# Patient Record
Sex: FEMALE | Race: BLACK OR AFRICAN AMERICAN | NOT HISPANIC OR LATINO | URBAN - METROPOLITAN AREA
[De-identification: names, ages, dates, MRNs, and addresses within clinical notes are randomized per-mention and may not be internally consistent; named-entity substitution may affect disease eponyms.]

---

## 2023-08-01 ENCOUNTER — APPOINTMENT (OUTPATIENT)
Dept: LAB | Facility: LAB | Age: 73
End: 2023-08-01
Payer: MEDICARE

## 2023-08-01 LAB
ACTIVATED PARTIAL THROMBOPLASTIN TIME IN PPP BY COAGULATION ASSAY: 33 SEC (ref 27–38)
ALBUMIN (G/DL) IN SER/PLAS: 3.8 G/DL (ref 3.4–5)
ANION GAP IN SER/PLAS: 11 MMOL/L (ref 10–20)
BASOPHILS (10*3/UL) IN BLOOD BY AUTOMATED COUNT: 0.03 X10E9/L (ref 0–0.1)
BASOPHILS/100 LEUKOCYTES IN BLOOD BY AUTOMATED COUNT: 0.7 % (ref 0–2)
CALCIUM (MG/DL) IN SER/PLAS: 9.6 MG/DL (ref 8.6–10.6)
CARBON DIOXIDE, TOTAL (MMOL/L) IN SER/PLAS: 29 MMOL/L (ref 21–32)
CHLORIDE (MMOL/L) IN SER/PLAS: 105 MMOL/L (ref 98–107)
CREATININE (MG/DL) IN SER/PLAS: 0.58 MG/DL (ref 0.5–1.05)
EOSINOPHILS (10*3/UL) IN BLOOD BY AUTOMATED COUNT: 0.27 X10E9/L (ref 0–0.4)
EOSINOPHILS/100 LEUKOCYTES IN BLOOD BY AUTOMATED COUNT: 6.1 % (ref 0–6)
ERYTHROCYTE DISTRIBUTION WIDTH (RATIO) BY AUTOMATED COUNT: 15.9 % (ref 11.5–14.5)
ERYTHROCYTE MEAN CORPUSCULAR HEMOGLOBIN CONCENTRATION (G/DL) BY AUTOMATED: 30.9 G/DL (ref 32–36)
ERYTHROCYTE MEAN CORPUSCULAR VOLUME (FL) BY AUTOMATED COUNT: 80 FL (ref 80–100)
ERYTHROCYTES (10*6/UL) IN BLOOD BY AUTOMATED COUNT: 4.52 X10E12/L (ref 4–5.2)
GFR FEMALE: >90 ML/MIN/1.73M2
GLUCOSE (MG/DL) IN SER/PLAS: 93 MG/DL (ref 74–99)
HEMATOCRIT (%) IN BLOOD BY AUTOMATED COUNT: 36.3 % (ref 36–46)
HEMOGLOBIN (G/DL) IN BLOOD: 11.2 G/DL (ref 12–16)
IMMATURE GRANULOCYTES/100 LEUKOCYTES IN BLOOD BY AUTOMATED COUNT: 0.2 % (ref 0–0.9)
INR IN PPP BY COAGULATION ASSAY: 1.1 (ref 0.9–1.1)
LEUKOCYTES (10*3/UL) IN BLOOD BY AUTOMATED COUNT: 4.4 X10E9/L (ref 4.4–11.3)
LYMPHOCYTES (10*3/UL) IN BLOOD BY AUTOMATED COUNT: 0.9 X10E9/L (ref 0.8–3)
LYMPHOCYTES/100 LEUKOCYTES IN BLOOD BY AUTOMATED COUNT: 20.5 % (ref 13–44)
MONOCYTES (10*3/UL) IN BLOOD BY AUTOMATED COUNT: 0.25 X10E9/L (ref 0.05–0.8)
MONOCYTES/100 LEUKOCYTES IN BLOOD BY AUTOMATED COUNT: 5.7 % (ref 2–10)
NEUTROPHILS (10*3/UL) IN BLOOD BY AUTOMATED COUNT: 2.94 X10E9/L (ref 1.6–5.5)
NEUTROPHILS/100 LEUKOCYTES IN BLOOD BY AUTOMATED COUNT: 66.8 % (ref 40–80)
NRBC (PER 100 WBCS) BY AUTOMATED COUNT: 0 /100 WBC (ref 0–0)
PARATHYRIN INTACT (PG/ML) IN SER/PLAS: 215.7 PG/ML (ref 18.5–88)
PHOSPHATE (MG/DL) IN SER/PLAS: 3.5 MG/DL (ref 2.5–4.9)
PLATELETS (10*3/UL) IN BLOOD AUTOMATED COUNT: 179 X10E9/L (ref 150–450)
POTASSIUM (MMOL/L) IN SER/PLAS: 3.9 MMOL/L (ref 3.5–5.3)
PROTHROMBIN TIME (PT) IN PPP BY COAGULATION ASSAY: 12.4 SEC (ref 9.8–12.8)
SODIUM (MMOL/L) IN SER/PLAS: 141 MMOL/L (ref 136–145)
THYROPEROXIDASE AB (IU/ML) IN SER/PLAS: 28 IU/ML
THYROTROPIN (MIU/L) IN SER/PLAS BY DETECTION LIMIT <= 0.05 MIU/L: 1.87 MIU/L (ref 0.44–3.98)
THYROXINE (T4) FREE (NG/DL) IN SER/PLAS: 1.13 NG/DL (ref 0.78–1.48)
TRIIODOTHYRONINE (T3) (NG/DL) IN SER/PLAS: 110 NG/DL (ref 60–200)
UREA NITROGEN (MG/DL) IN SER/PLAS: 8 MG/DL (ref 6–23)

## 2023-08-03 LAB — VITAMIN D 1,25-DIHYDROXY: 69.4 PG/ML (ref 19.9–79.3)

## 2023-08-04 LAB
THYROGLOBULIN AB (IU/ML) IN SER/PLAS: <0.9 IU/ML (ref 0–4)
THYROGLOBULIN LC-MS/MS: ABNORMAL NG/ML (ref 1.3–31.8)
THYROGLOBULIN: 36.4 NG/ML (ref 1.3–31.8)

## 2023-08-05 LAB — THYROID STIMULATING IMMUNOGLOBULIN: <1 TSI INDEX

## 2023-08-11 LAB
ALBUMIN (G/DL) IN SER/PLAS: 4 G/DL (ref 3.4–5)
ANION GAP IN SER/PLAS: 11 MMOL/L (ref 10–20)
CALCIUM (MG/DL) IN SER/PLAS: 10 MG/DL (ref 8.6–10.6)
CARBON DIOXIDE, TOTAL (MMOL/L) IN SER/PLAS: 30 MMOL/L (ref 21–32)
CHLORIDE (MMOL/L) IN SER/PLAS: 105 MMOL/L (ref 98–107)
CREATININE (MG/DL) IN SER/PLAS: 0.64 MG/DL (ref 0.5–1.05)
GFR FEMALE: >90 ML/MIN/1.73M2
GLUCOSE (MG/DL) IN SER/PLAS: 95 MG/DL (ref 74–99)
PARATHYRIN INTACT (PG/ML) IN SER/PLAS: 317.4 PG/ML (ref 18.5–88)
PHOSPHATE (MG/DL) IN SER/PLAS: 3.9 MG/DL (ref 2.5–4.9)
POTASSIUM (MMOL/L) IN SER/PLAS: 4 MMOL/L (ref 3.5–5.3)
SODIUM (MMOL/L) IN SER/PLAS: 142 MMOL/L (ref 136–145)
UREA NITROGEN (MG/DL) IN SER/PLAS: 13 MG/DL (ref 6–23)

## 2023-08-17 LAB
CALCIUM (MG/DL) IN URINE: 9.2 MG/DL
CALCIUM (MG/L) IN 24 HOUR URINE: 92 MG/24H (ref 100–300)
COLLECTION DURATION OF URINE: 24 HR
CREATININE (MG/24HR) IN 24 HOUR URINE: 1.12 G/24H (ref 0.67–1.59)
CREATININE (MG/DL) IN URINE: 111.8 MG/DL (ref 20–320)
PHOSPHATE (MG/DL) IN URINE: 75 MG/DL
PHOSPHORUS 24 HOUR URINE: 750 MG/24H (ref 400–1300)
SODIUM (MMOL/24 HR) IN 24 HOUR URINE: 81 MMOL/24H (ref 80–220)
SODIUM (MMOL/L ) IN URINE: 81 MMOL/L
VOLUME OF URINE: 1000 ML

## 2023-08-22 ENCOUNTER — HOSPITAL ENCOUNTER (OUTPATIENT)
Dept: DATA CONVERSION | Facility: HOSPITAL | Age: 73
End: 2023-08-22
Attending: INTERNAL MEDICINE | Admitting: INTERNAL MEDICINE
Payer: COMMERCIAL

## 2023-08-22 DIAGNOSIS — E04.1 NONTOXIC SINGLE THYROID NODULE: ICD-10-CM

## 2023-08-22 DIAGNOSIS — E04.2 NONTOXIC MULTINODULAR GOITER: ICD-10-CM

## 2023-08-23 LAB
COMPLETE PATHOLOGY REPORT: NORMAL
CONVERTED CLINICAL DIAGNOSIS-HISTORY: NORMAL
CONVERTED DIAGNOSIS COMMENT: NORMAL
CONVERTED FINAL DIAGNOSIS: NORMAL
CONVERTED FINAL REPORT PDF LINK TO COPY AND PASTE: NORMAL
CONVERTED SPECIMEN DESCRIPTION: NORMAL

## 2023-08-24 LAB
COMPLETE PATHOLOGY REPORT: NORMAL
CONVERTED CLINICAL DIAGNOSIS-HISTORY: NORMAL
CONVERTED FINAL DIAGNOSIS: NORMAL
CONVERTED FINAL REPORT PDF LINK TO COPY AND PASTE: NORMAL
CONVERTED GROSS DESCRIPTION: NORMAL

## 2023-09-03 PROBLEM — I89.0 LYMPHEDEMA: Status: ACTIVE | Noted: 2023-09-03

## 2023-09-03 PROBLEM — M17.11 OSTEOARTHRITIS OF RIGHT KNEE: Status: ACTIVE | Noted: 2023-09-03

## 2023-09-03 PROBLEM — R92.8 ABNORMAL MAMMOGRAM: Status: ACTIVE | Noted: 2023-09-03

## 2023-09-03 PROBLEM — E83.52 HYPERCALCEMIA: Status: ACTIVE | Noted: 2023-09-03

## 2023-09-03 PROBLEM — E88.09 HYPERPROTEINEMIA: Status: ACTIVE | Noted: 2023-09-03

## 2023-09-03 PROBLEM — E55.9 VITAMIN D DEFICIENCY, UNSPECIFIED: Status: ACTIVE | Noted: 2023-09-03

## 2023-09-03 PROBLEM — R73.03 PREDIABETES: Status: ACTIVE | Noted: 2023-09-03

## 2023-09-03 RX ORDER — SULFAMETHOXAZOLE AND TRIMETHOPRIM 800; 160 MG/1; MG/1
TABLET ORAL
COMMUNITY
Start: 2023-03-16 | End: 2023-11-06 | Stop reason: SDUPTHER

## 2023-09-03 RX ORDER — NADOLOL 80 MG/1
1 TABLET ORAL DAILY
COMMUNITY

## 2023-09-03 RX ORDER — GENTAMICIN SULFATE 1 MG/G
OINTMENT TOPICAL
COMMUNITY
End: 2024-02-27 | Stop reason: SDUPTHER

## 2023-09-03 RX ORDER — LISINOPRIL 40 MG/1
1 TABLET ORAL DAILY
COMMUNITY
End: 2024-02-14

## 2023-09-03 RX ORDER — AMLODIPINE BESYLATE 10 MG/1
1 TABLET ORAL DAILY
COMMUNITY
End: 2024-03-04

## 2023-09-03 RX ORDER — PRAZOSIN HYDROCHLORIDE 2 MG/1
1 CAPSULE ORAL 2 TIMES DAILY
COMMUNITY

## 2023-09-03 RX ORDER — ALBUTEROL SULFATE 90 UG/1
1 AEROSOL, METERED RESPIRATORY (INHALATION) EVERY 6 HOURS PRN
COMMUNITY
Start: 2013-01-05 | End: 2023-12-13 | Stop reason: SDUPTHER

## 2023-09-26 ENCOUNTER — HOSPITAL ENCOUNTER (OUTPATIENT)
Dept: DATA CONVERSION | Facility: HOSPITAL | Age: 73
Discharge: HOME | End: 2023-09-26
Payer: COMMERCIAL

## 2023-09-26 DIAGNOSIS — Z12.31 ENCOUNTER FOR SCREENING MAMMOGRAM FOR MALIGNANT NEOPLASM OF BREAST: ICD-10-CM

## 2023-10-10 ENCOUNTER — CLINICAL SUPPORT (OUTPATIENT)
Dept: PRIMARY CARE | Facility: CLINIC | Age: 73
End: 2023-10-10
Payer: MEDICARE

## 2023-10-10 ENCOUNTER — TELEPHONE (OUTPATIENT)
Dept: PRIMARY CARE | Facility: CLINIC | Age: 73
End: 2023-10-10

## 2023-10-10 DIAGNOSIS — Z23 NEED FOR VACCINATION: ICD-10-CM

## 2023-10-10 PROCEDURE — 90662 IIV NO PRSV INCREASED AG IM: CPT | Performed by: INTERNAL MEDICINE

## 2023-10-10 PROCEDURE — G0008 ADMIN INFLUENZA VIRUS VAC: HCPCS | Performed by: INTERNAL MEDICINE

## 2023-10-20 DIAGNOSIS — E83.52 HYPERCALCEMIA: Primary | ICD-10-CM

## 2023-10-20 NOTE — PROGRESS NOTES
Placing lab orders --Will repeat CMP, phos, and PTH after initiation of vitamin D 4000IU daily 2 mo ago (pt has MGUS therefore would like to check a total protein with the CMP))

## 2023-11-06 ENCOUNTER — TELEPHONE (OUTPATIENT)
Dept: PRIMARY CARE | Facility: CLINIC | Age: 73
End: 2023-11-06
Payer: COMMERCIAL

## 2023-11-06 DIAGNOSIS — L03.115 BILATERAL LOWER LEG CELLULITIS: Primary | ICD-10-CM

## 2023-11-06 DIAGNOSIS — L03.116 BILATERAL LOWER LEG CELLULITIS: Primary | ICD-10-CM

## 2023-11-06 RX ORDER — SULFAMETHOXAZOLE AND TRIMETHOPRIM 800; 160 MG/1; MG/1
1 TABLET ORAL 2 TIMES DAILY
Qty: 20 TABLET | Refills: 0 | Status: SHIPPED | OUTPATIENT
Start: 2023-11-06 | End: 2023-11-16

## 2023-11-06 NOTE — TELEPHONE ENCOUNTER
Pt left VM requesting a refill of the antibiotics she takes when her legs start to weep. Pt stated the antibiotic is sulfamethazole-trimethoprim

## 2023-12-13 ENCOUNTER — OFFICE VISIT (OUTPATIENT)
Dept: PRIMARY CARE | Facility: CLINIC | Age: 73
End: 2023-12-13
Payer: MEDICARE

## 2023-12-13 ENCOUNTER — TELEPHONE (OUTPATIENT)
Dept: PRIMARY CARE | Facility: CLINIC | Age: 73
End: 2023-12-13

## 2023-12-13 VITALS
HEIGHT: 66 IN | SYSTOLIC BLOOD PRESSURE: 120 MMHG | TEMPERATURE: 98.7 F | OXYGEN SATURATION: 99 % | HEART RATE: 64 BPM | WEIGHT: 293 LBS | BODY MASS INDEX: 47.09 KG/M2 | DIASTOLIC BLOOD PRESSURE: 70 MMHG

## 2023-12-13 DIAGNOSIS — I10 ESSENTIAL (PRIMARY) HYPERTENSION: ICD-10-CM

## 2023-12-13 DIAGNOSIS — Z00.00 ADULT GENERAL MEDICAL EXAM: Primary | ICD-10-CM

## 2023-12-13 DIAGNOSIS — J40 BRONCHITIS: ICD-10-CM

## 2023-12-13 DIAGNOSIS — I89.0 LYMPHEDEMA: ICD-10-CM

## 2023-12-13 PROCEDURE — 1126F AMNT PAIN NOTED NONE PRSNT: CPT | Performed by: INTERNAL MEDICINE

## 2023-12-13 PROCEDURE — 1159F MED LIST DOCD IN RCRD: CPT | Performed by: INTERNAL MEDICINE

## 2023-12-13 PROCEDURE — 3078F DIAST BP <80 MM HG: CPT | Performed by: INTERNAL MEDICINE

## 2023-12-13 PROCEDURE — 99215 OFFICE O/P EST HI 40 MIN: CPT | Performed by: INTERNAL MEDICINE

## 2023-12-13 PROCEDURE — 1036F TOBACCO NON-USER: CPT | Performed by: INTERNAL MEDICINE

## 2023-12-13 PROCEDURE — 3074F SYST BP LT 130 MM HG: CPT | Performed by: INTERNAL MEDICINE

## 2023-12-13 PROCEDURE — G0439 PPPS, SUBSEQ VISIT: HCPCS | Performed by: INTERNAL MEDICINE

## 2023-12-13 RX ORDER — SULFAMETHOXAZOLE AND TRIMETHOPRIM 800; 160 MG/1; MG/1
1 TABLET ORAL 2 TIMES DAILY
Qty: 20 TABLET | Refills: 1 | Status: SHIPPED | OUTPATIENT
Start: 2023-12-13 | End: 2023-12-23

## 2023-12-13 RX ORDER — ALBUTEROL SULFATE 90 UG/1
1 AEROSOL, METERED RESPIRATORY (INHALATION) EVERY 6 HOURS PRN
Qty: 8 G | Refills: 1 | Status: SHIPPED | OUTPATIENT
Start: 2023-12-13

## 2023-12-13 ASSESSMENT — PATIENT HEALTH QUESTIONNAIRE - PHQ9
SUM OF ALL RESPONSES TO PHQ9 QUESTIONS 1 AND 2: 0
2. FEELING DOWN, DEPRESSED OR HOPELESS: NOT AT ALL
1. LITTLE INTEREST OR PLEASURE IN DOING THINGS: NOT AT ALL

## 2023-12-13 ASSESSMENT — PAIN SCALES - GENERAL: PAINLEVEL: 0-NO PAIN

## 2023-12-13 NOTE — PROGRESS NOTES
Subjective   Reason for Visit: May Pratt is an 73 y.o. female here for a Annual Exam.     Past Medical, Surgical, and Family History reviewed and updated in chart.    Reviewed all medications by prescribing practitioner or clinical pharmacist (such as prescriptions, OTCs, herbal therapies and supplements) and documented in the medical record.    HPI  1.  Medicare wellness/physical exam. Pt feels well. She is requesting med RF.  Pap: last done, N/A  Mammogram: last done 9/2023  Colonoscopy: last done 6/2022 (polyp)  Immunizations:     Review of Systems  All pertinent positive symptoms are included in the history of present illness.    All other systems have been reviewed and are negative and noncontributory to this patient's current ailments.    Current Outpatient Medications   Medication Instructions    albuterol 90 mcg/actuation inhaler 1 puff, inhalation, Every 6 hours PRN    amLODIPine (Norvasc) 10 mg tablet 1 tablet, oral, Daily    gentamicin (Garamycin) 0.1 % ointment apply to affected area Externally Twice a day<BR>    lisinopril 40 mg tablet 1 tablet, oral, Daily    nadolol (Corgard) 80 mg tablet 1 tablet, oral, Daily    prazosin (Minipress) 2 mg capsule 1 capsule, oral, 2 times daily    sulfamethoxazole-trimethoprim (Bactrim DS) 800-160 mg tablet 1 tablet, oral, 2 times daily     No Known Allergies  Immunization History   Administered Date(s) Administered    Flu vaccine, quadrivalent, high-dose, preservative free, age 65y+ (FLUZONE) 11/16/2020, 11/17/2021, 12/01/2022, 10/10/2023    Influenza, High Dose Seasonal, Preservative Free 10/05/2018, 11/14/2019    Influenza, Unspecified 10/22/2008, 10/24/2009, 10/08/2011, 10/06/2012, 11/02/2013    Influenza, injectable, quadrivalent 10/07/2016, 10/09/2017    Influenza, seasonal, injectable 09/30/1997, 10/06/2012, 11/02/2013, 09/08/2014, 11/11/2015    Moderna COVID-19 vaccine, bivalent, blue cap/gray label *Check age/dose* 11/03/2022    Moderna SARS-CoV-2  "Vaccination 03/11/2021, 04/08/2021, 11/04/2021, 04/21/2022    Pneumococcal conjugate vaccine, 13-valent (PREVNAR 13) 04/22/2015    Pneumococcal polysaccharide vaccine, 23-valent, age 2 years and older (PNEUMOVAX 23) 09/02/2016    Tdap vaccine, age 7 year and older (BOOSTRIX) 09/08/2014    Zoster, live 12/08/2014     Past Surgical History:   Procedure Laterality Date    US GUIDED THYROID BIOPSY  8/22/2023    US GUIDED THYROID BIOPSY 8/22/2023 [facility]    US GUIDED THYROID BIOPSY  8/22/2023    US GUIDED THYROID BIOPSY 8/22/2023 [facility]     No family history on file.    Social History     Tobacco Use    Smoking status: Never    Smokeless tobacco: Never   Substance Use Topics    Alcohol use: Never    Drug use: Never                         Objective   Visit Vitals  /70   Pulse 64   Temp 37.1 °C (98.7 °F)   Ht 1.676 m (5' 6\")   Wt 145 kg (319 lb)   SpO2 99%   BMI 51.49 kg/m²   Smoking Status Never   BSA 2.6 m²      Physical Exam  CONSTITUTIONAL - well nourished, well developed, looks like stated age, in no acute distress, not ill-appearing, and not tired appearing  SKIN - normal skin color and pigmentation, normal skin turgor without rash, lesions, or nodules visualized  HEAD - no trauma, normocephalic  EYES - pupils are equal and reactive to light, extraocular muscles are intact, and normal external exam  ENT - TM's intact, no injection, no signs of infection, uvula midline, normal tongue movement and throat normal, no exudate, nasal passage without discharge and patent  NECK - supple without rigidity, no neck mass was observed, no thyromegaly or thyroid nodules  CHEST - clear to auscultation, no wheezing, no crackles and no rales, good effort  CARDIAC - regular rate and regular rhythm, no skipped beats, no murmur  ABDOMEN - no organomegaly, soft, nontender, nondistended, normal bowel sounds, no guarding/rebound/rigidity, negative McBurney sign and negative Wallace sign  EXTREMITIES - + lymphedema BLE  NEUROLOGICAL - " normal gait, normal balance, normal motor, no ataxia; alert, oriented and no focal signs  PSYCHIATRIC - alert, pleasant and cordial, age-appropriate  IMMUNOLOGIC - no cervical lymphadenopathy    Assessment/Plan   Problem List Items Addressed This Visit       Essential (primary) hypertension    Relevant Orders    Lipid panel    Lymphedema    Relevant Medications    sulfamethoxazole-trimethoprim (Bactrim DS) 800-160 mg tablet     Other Visit Diagnoses       Adult general medical exam    -  Primary    Healthy diet and physical activity as tolerated    Bronchitis        Relevant Medications    albuterol 90 mcg/actuation inhaler          Patient Care Team:  Katie Melissa MD as PCP - General (Internal Medicine)  Katie Melissa MD as Primary Care Provider  Katie Melissa MD (Internal Medicine)

## 2023-12-14 DIAGNOSIS — E83.52 HYPERCALCEMIA: Primary | ICD-10-CM

## 2023-12-15 ENCOUNTER — LAB (OUTPATIENT)
Dept: LAB | Facility: LAB | Age: 73
End: 2023-12-15
Payer: MEDICARE

## 2023-12-15 DIAGNOSIS — I10 ESSENTIAL (PRIMARY) HYPERTENSION: ICD-10-CM

## 2023-12-15 DIAGNOSIS — E83.52 HYPERCALCEMIA: ICD-10-CM

## 2023-12-15 LAB
ALBUMIN SERPL BCP-MCNC: 4 G/DL (ref 3.4–5)
ALP SERPL-CCNC: 87 U/L (ref 33–136)
ALT SERPL W P-5'-P-CCNC: 11 U/L (ref 7–45)
ANION GAP SERPL CALC-SCNC: 10 MMOL/L (ref 10–20)
AST SERPL W P-5'-P-CCNC: 15 U/L (ref 9–39)
BILIRUB SERPL-MCNC: 0.7 MG/DL (ref 0–1.2)
BUN SERPL-MCNC: 18 MG/DL (ref 6–23)
CALCIUM SERPL-MCNC: 10.1 MG/DL (ref 8.6–10.6)
CHLORIDE SERPL-SCNC: 107 MMOL/L (ref 98–107)
CHOLEST SERPL-MCNC: 194 MG/DL (ref 0–199)
CHOLESTEROL/HDL RATIO: 2.2
CO2 SERPL-SCNC: 29 MMOL/L (ref 21–32)
CREAT SERPL-MCNC: 0.81 MG/DL (ref 0.5–1.05)
GFR SERPL CREATININE-BSD FRML MDRD: 77 ML/MIN/1.73M*2
GLUCOSE SERPL-MCNC: 100 MG/DL (ref 74–99)
HDLC SERPL-MCNC: 87.9 MG/DL
LDLC SERPL CALC-MCNC: 95 MG/DL
NON HDL CHOLESTEROL: 106 MG/DL (ref 0–149)
PHOSPHATE SERPL-MCNC: 3.4 MG/DL (ref 2.5–4.9)
POTASSIUM SERPL-SCNC: 4.1 MMOL/L (ref 3.5–5.3)
PROT SERPL-MCNC: 8.9 G/DL (ref 6.4–8.2)
PTH-INTACT SERPL-MCNC: 261.1 PG/ML (ref 18.5–88)
SODIUM SERPL-SCNC: 142 MMOL/L (ref 136–145)
TRIGL SERPL-MCNC: 55 MG/DL (ref 0–149)
VLDL: 11 MG/DL (ref 0–40)

## 2023-12-15 PROCEDURE — 80053 COMPREHEN METABOLIC PANEL: CPT

## 2023-12-15 PROCEDURE — 80061 LIPID PANEL: CPT

## 2023-12-15 PROCEDURE — 83970 ASSAY OF PARATHORMONE: CPT

## 2023-12-15 PROCEDURE — 84100 ASSAY OF PHOSPHORUS: CPT

## 2023-12-15 PROCEDURE — 36415 COLL VENOUS BLD VENIPUNCTURE: CPT

## 2023-12-27 ENCOUNTER — TELEPHONE (OUTPATIENT)
Dept: PEDIATRIC GASTROENTEROLOGY | Facility: CLINIC | Age: 73
End: 2023-12-27
Payer: COMMERCIAL

## 2023-12-27 DIAGNOSIS — E83.52 HYPERCALCEMIA: Primary | ICD-10-CM

## 2023-12-27 NOTE — TELEPHONE ENCOUNTER
Spoke with patient regarding follow-up labs. Patient's PTH is 261.1, Ca is 10.1, albumin 4.0, and phos 3.4. Based on prior 24 hr urine studies, patient likely has FHH. We started her on vitamin D 5000IU back in August. Her PTH is now lower, likely as a result of the vitamin D.     Patient's Cr did increase mildly to 0.81, asked her to stay well-hydrated. Will continue same dose of vitamin D. Patient has follow-up in March, asked her to get labs prior to that appointment.    Plan discussed with Dr. Alberts.

## 2024-02-13 DIAGNOSIS — I10 ESSENTIAL (PRIMARY) HYPERTENSION: ICD-10-CM

## 2024-02-14 RX ORDER — LISINOPRIL 40 MG/1
40 TABLET ORAL DAILY
Qty: 90 TABLET | Refills: 3 | Status: SHIPPED | OUTPATIENT
Start: 2024-02-14

## 2024-02-23 ENCOUNTER — TELEPHONE (OUTPATIENT)
Dept: PRIMARY CARE | Facility: CLINIC | Age: 74
End: 2024-02-23
Payer: COMMERCIAL

## 2024-02-23 NOTE — TELEPHONE ENCOUNTER
In order for home health to be approved, she would have to be considered house bound (too hard for her to leave the home). She would also have to have a specialty like a home health nurse or physical therapy come to the home, in addition to the home health aide. Insurance will not pay for a home health aide alone. If she feels she meets these criteria, please schedule a video visit to discuss for documentation purposes.

## 2024-02-23 NOTE — TELEPHONE ENCOUNTER
Call placed to patient, states she would like to know if PCP can help her set up having an aid come to her home a few times a week to assist her with her sequential compression machine due to her lymphedema.

## 2024-02-26 NOTE — TELEPHONE ENCOUNTER
States she is dealing w lymphedema of the lower extremeties which is getting worse and she is having a hard time walking and getting around. She would like to schedule the appt to discuss.

## 2024-02-27 ENCOUNTER — HOME HEALTH ADMISSION (OUTPATIENT)
Dept: HOME HEALTH SERVICES | Facility: HOME HEALTH | Age: 74
End: 2024-02-27

## 2024-02-27 ENCOUNTER — DOCUMENTATION (OUTPATIENT)
Dept: HOME HEALTH SERVICES | Facility: HOME HEALTH | Age: 74
End: 2024-02-27

## 2024-02-27 ENCOUNTER — TELEMEDICINE (OUTPATIENT)
Dept: PRIMARY CARE | Facility: CLINIC | Age: 74
End: 2024-02-27
Payer: MEDICARE

## 2024-02-27 DIAGNOSIS — R26.9 GAIT DISTURBANCE: ICD-10-CM

## 2024-02-27 DIAGNOSIS — I89.0 LYMPHEDEMA: Primary | ICD-10-CM

## 2024-02-27 PROCEDURE — 1126F AMNT PAIN NOTED NONE PRSNT: CPT | Performed by: INTERNAL MEDICINE

## 2024-02-27 PROCEDURE — 1036F TOBACCO NON-USER: CPT | Performed by: INTERNAL MEDICINE

## 2024-02-27 PROCEDURE — 99213 OFFICE O/P EST LOW 20 MIN: CPT | Performed by: INTERNAL MEDICINE

## 2024-02-27 PROCEDURE — 1159F MED LIST DOCD IN RCRD: CPT | Performed by: INTERNAL MEDICINE

## 2024-02-27 RX ORDER — SULFAMETHOXAZOLE AND TRIMETHOPRIM 800; 160 MG/1; MG/1
1 TABLET ORAL 2 TIMES DAILY
Qty: 14 TABLET | Refills: 1 | Status: SHIPPED | OUTPATIENT
Start: 2024-02-27 | End: 2024-03-01 | Stop reason: SDUPTHER

## 2024-02-27 RX ORDER — GENTAMICIN SULFATE 1 MG/G
OINTMENT TOPICAL 3 TIMES DAILY
Qty: 90 G | Refills: 3 | Status: SHIPPED | OUTPATIENT
Start: 2024-02-27 | End: 2024-03-01 | Stop reason: SDUPTHER

## 2024-02-27 RX ORDER — SULFAMETHOXAZOLE AND TRIMETHOPRIM 800; 160 MG/1; MG/1
1 TABLET ORAL 2 TIMES DAILY
COMMUNITY
Start: 2023-03-16 | End: 2024-02-27 | Stop reason: SDUPTHER

## 2024-02-27 ASSESSMENT — PATIENT HEALTH QUESTIONNAIRE - PHQ9
1. LITTLE INTEREST OR PLEASURE IN DOING THINGS: NOT AT ALL
2. FEELING DOWN, DEPRESSED OR HOPELESS: NOT AT ALL
SUM OF ALL RESPONSES TO PHQ9 QUESTIONS 1 AND 2: 0

## 2024-02-27 NOTE — PROGRESS NOTES
Subjective   Patient ID: May Pratt is a 73 y.o. female who presents for discuss home care.    This is a 73 y.o. with a h/o chronic BLE lymphedema. Over the last month, the swelling has worsened. Her thighs are swollen <R>L). It is affecting her gait and she is unable to put her feet together. Her gait is impaired and she is asking for a walker. She has been unable to drive. She is unable to bend over and wash her feet. Her legs weep and she has to change her dressings twice a day, which is becoming harder. She has a sequential compression machine that would help her lymphedema, if she were able to use it daily. She needs assistance with this and is requesting Home Health care.   She also needs medication RF for her lymphedema care.         Review of Systems   All other systems reviewed and are negative.      Objective   There were no vitals taken for this visit.    Physical Exam  Constitutional:       General: She is not in acute distress.     Appearance: Normal appearance. She is obese.   Pulmonary:      Effort: Pulmonary effort is normal.   Neurological:      General: No focal deficit present.      Mental Status: She is alert and oriented to person, place, and time.   Psychiatric:         Mood and Affect: Mood normal.         Assessment/Plan   Diagnoses and all orders for this visit:  Lymphedema  -     gentamicin (Garamycin) 0.1 % ointment; Apply topically 3 times a day. apply to affected area Externally Twice a day  -     sulfamethoxazole-trimethoprim (Bactrim DS) 800-160 mg tablet; Take 1 tablet by mouth 2 times a day for 7 days.  -     Referral to Home Care; Future  Gait disturbance  Comments:  Order placed for rollator. Will ask home PT to assess pt for additional needs  Orders:  -     Referral to Home Care; Future

## 2024-02-27 NOTE — TELEPHONE ENCOUNTER
Hello,      ThaThank you for your referral to  Home Care.  At this time, we are unable to accommodate your patient's need at this time d/t staffing.          [facility] - P: [phone]; F: [phone]            [facility] - P: [phone]; F:[phone]         You may contact one of these agencies, or an alternate of your choice, to see if they are able to accept your patient.    Thank you,  [facility] Intake

## 2024-02-27 NOTE — TELEPHONE ENCOUNTER
Thank you for the update. Please notify pt that a referral has been sent to [facility].

## 2024-02-27 NOTE — HH CARE COORDINATION
Home Care received a referral for Nursing, Physical Therapy, and Home Health Aide. Unfortunately, we are unable to accept and process the referral at this time.    Patients, please reach out to the referring provider or your PCP to assist in obtaining an alternative home care agency and/or guidance to meet your needs.    Providers, please reach out to  Home Care with any questions regarding the declined referral.

## 2024-02-28 ENCOUNTER — TELEPHONE (OUTPATIENT)
Dept: PRIMARY CARE | Facility: CLINIC | Age: 74
End: 2024-02-28
Payer: COMMERCIAL

## 2024-02-28 DIAGNOSIS — I89.0 LYMPHEDEMA: ICD-10-CM

## 2024-02-28 NOTE — TELEPHONE ENCOUNTER
Calling stating they are not able to accept referral as they are short staffed in that area.  Please advise with new referral.

## 2024-03-01 DIAGNOSIS — Z76.0 MEDICATION REFILL: ICD-10-CM

## 2024-03-01 RX ORDER — SULFAMETHOXAZOLE AND TRIMETHOPRIM 800; 160 MG/1; MG/1
1 TABLET ORAL 2 TIMES DAILY
Qty: 14 TABLET | Refills: 1 | Status: SHIPPED | OUTPATIENT
Start: 2024-03-01 | End: 2024-03-05 | Stop reason: SDUPTHER

## 2024-03-01 RX ORDER — GENTAMICIN SULFATE 1 MG/G
OINTMENT TOPICAL 3 TIMES DAILY
Qty: 90 G | Refills: 3 | Status: SHIPPED | OUTPATIENT
Start: 2024-03-01 | End: 2024-03-05 | Stop reason: SDUPTHER

## 2024-03-01 NOTE — TELEPHONE ENCOUNTER
Patient requesting the populated rxs to be sent over for 90 day supply so her insurance will cover it

## 2024-03-04 DIAGNOSIS — I89.0 LYMPHEDEMA: ICD-10-CM

## 2024-03-04 RX ORDER — AMLODIPINE BESYLATE 10 MG/1
10 TABLET ORAL DAILY
Qty: 90 TABLET | Refills: 3 | Status: SHIPPED | OUTPATIENT
Start: 2024-03-04

## 2024-03-04 NOTE — TELEPHONE ENCOUNTER
States we were supposed to send rx for abt oral and ointment and [facility] had a mixup and couldn't fill it - she is asking if we can resend to [facility]

## 2024-03-05 RX ORDER — SULFAMETHOXAZOLE AND TRIMETHOPRIM 800; 160 MG/1; MG/1
1 TABLET ORAL 2 TIMES DAILY
Qty: 14 TABLET | Refills: 1 | Status: SHIPPED | OUTPATIENT
Start: 2024-03-05 | End: 2024-03-29 | Stop reason: HOSPADM

## 2024-03-05 RX ORDER — GENTAMICIN SULFATE 1 MG/G
OINTMENT TOPICAL 3 TIMES DAILY
Qty: 90 G | Refills: 3 | Status: SHIPPED | OUTPATIENT
Start: 2024-03-05 | End: 2024-03-29 | Stop reason: HOSPADM

## 2024-03-19 ENCOUNTER — APPOINTMENT (OUTPATIENT)
Dept: ENDOCRINOLOGY | Facility: CLINIC | Age: 74
End: 2024-03-19
Payer: MEDICARE

## 2024-03-25 ENCOUNTER — APPOINTMENT (OUTPATIENT)
Dept: RADIOLOGY | Facility: HOSPITAL | Age: 74
DRG: 593 | End: 2024-03-25
Payer: MEDICARE

## 2024-03-25 ENCOUNTER — TELEPHONE (OUTPATIENT)
Dept: PRIMARY CARE | Facility: CLINIC | Age: 74
End: 2024-03-25
Payer: COMMERCIAL

## 2024-03-25 ENCOUNTER — HOSPITAL ENCOUNTER (INPATIENT)
Facility: HOSPITAL | Age: 74
LOS: 4 days | Discharge: NURSING, REHAB OR LONG-TERM CARE | DRG: 593 | End: 2024-03-29
Attending: EMERGENCY MEDICINE | Admitting: NURSE PRACTITIONER
Payer: MEDICARE

## 2024-03-25 DIAGNOSIS — E65 PANNUS, ABDOMINAL: ICD-10-CM

## 2024-03-25 DIAGNOSIS — I89.0 LYMPHEDEMA DUE TO CHRONIC INFLAMMATION: ICD-10-CM

## 2024-03-25 DIAGNOSIS — L03.115 CELLULITIS OF RIGHT LOWER EXTREMITY: ICD-10-CM

## 2024-03-25 DIAGNOSIS — I89.0 LYMPHEDEMA: ICD-10-CM

## 2024-03-25 DIAGNOSIS — L97.515 ULCER OF RIGHT FOOT WITH MUSCLE INVOLVEMENT WITHOUT EVIDENCE OF NECROSIS (MULTI): Primary | ICD-10-CM

## 2024-03-25 DIAGNOSIS — L30.4 INTERTRIGO: ICD-10-CM

## 2024-03-25 DIAGNOSIS — L03.90 CELLULITIS, UNSPECIFIED CELLULITIS SITE: ICD-10-CM

## 2024-03-25 DIAGNOSIS — M17.11 OSTEOARTHRITIS OF RIGHT KNEE, UNSPECIFIED OSTEOARTHRITIS TYPE: ICD-10-CM

## 2024-03-25 DIAGNOSIS — K59.00 CONSTIPATION, UNSPECIFIED CONSTIPATION TYPE: ICD-10-CM

## 2024-03-25 LAB
ALBUMIN SERPL BCP-MCNC: 3.8 G/DL (ref 3.4–5)
ALP SERPL-CCNC: 86 U/L (ref 33–136)
ALT SERPL W P-5'-P-CCNC: 17 U/L (ref 7–45)
ANION GAP BLDV CALCULATED.4IONS-SCNC: ABNORMAL MMOL/L
ANION GAP SERPL CALC-SCNC: 14 MMOL/L (ref 10–20)
APPEARANCE UR: ABNORMAL
AST SERPL W P-5'-P-CCNC: 36 U/L (ref 9–39)
BASE EXCESS BLDV CALC-SCNC: ABNORMAL MMOL/L
BASOPHILS # BLD AUTO: 0.02 X10*3/UL (ref 0–0.1)
BASOPHILS NFR BLD AUTO: 0.2 %
BILIRUB SERPL-MCNC: 0.7 MG/DL (ref 0–1.2)
BILIRUB UR STRIP.AUTO-MCNC: NEGATIVE MG/DL
BODY TEMPERATURE: 37 DEGREES CELSIUS
BUN SERPL-MCNC: 32 MG/DL (ref 6–23)
CA-I BLDV-SCNC: ABNORMAL MMOL/L
CALCIUM SERPL-MCNC: 10.9 MG/DL (ref 8.6–10.6)
CHLORIDE BLDV-SCNC: ABNORMAL MMOL/L
CHLORIDE SERPL-SCNC: 104 MMOL/L (ref 98–107)
CK SERPL-CCNC: 1194 U/L (ref 0–215)
CO2 SERPL-SCNC: 25 MMOL/L (ref 21–32)
COLOR UR: YELLOW
CREAT SERPL-MCNC: 1.13 MG/DL (ref 0.5–1.05)
EGFRCR SERPLBLD CKD-EPI 2021: 52 ML/MIN/1.73M*2
EOSINOPHIL # BLD AUTO: 0.02 X10*3/UL (ref 0–0.4)
EOSINOPHIL NFR BLD AUTO: 0.2 %
ERYTHROCYTE [DISTWIDTH] IN BLOOD BY AUTOMATED COUNT: 15.2 % (ref 11.5–14.5)
EST. AVERAGE GLUCOSE BLD GHB EST-MCNC: 117 MG/DL
GLUCOSE BLDV-MCNC: ABNORMAL MG/DL
GLUCOSE SERPL-MCNC: 88 MG/DL (ref 74–99)
GLUCOSE UR STRIP.AUTO-MCNC: NORMAL MG/DL
HBA1C MFR BLD: 5.7 %
HCO3 BLDV-SCNC: ABNORMAL MMOL/L
HCT VFR BLD AUTO: 35.5 % (ref 36–46)
HCT VFR BLD EST: 35 % (ref 36–46)
HGB BLD-MCNC: 11.6 G/DL (ref 12–16)
HGB BLDV-MCNC: 11.8 G/DL (ref 12–16)
HOLD SPECIMEN: NORMAL
IMM GRANULOCYTES # BLD AUTO: 0.13 X10*3/UL (ref 0–0.5)
IMM GRANULOCYTES NFR BLD AUTO: 1.6 % (ref 0–0.9)
KETONES UR STRIP.AUTO-MCNC: ABNORMAL MG/DL
LACTATE BLDV-SCNC: ABNORMAL MMOL/L
LEUKOCYTE ESTERASE UR QL STRIP.AUTO: ABNORMAL
LYMPHOCYTES # BLD AUTO: 0.79 X10*3/UL (ref 0.8–3)
LYMPHOCYTES NFR BLD AUTO: 9.9 %
MAGNESIUM SERPL-MCNC: 2.2 MG/DL (ref 1.6–2.4)
MCH RBC QN AUTO: 24.6 PG (ref 26–34)
MCHC RBC AUTO-ENTMCNC: 32.7 G/DL (ref 32–36)
MCV RBC AUTO: 75 FL (ref 80–100)
MONOCYTES # BLD AUTO: 0.68 X10*3/UL (ref 0.05–0.8)
MONOCYTES NFR BLD AUTO: 8.5 %
MUCOUS THREADS #/AREA URNS AUTO: ABNORMAL /LPF
NEUTROPHILS # BLD AUTO: 6.38 X10*3/UL (ref 1.6–5.5)
NEUTROPHILS NFR BLD AUTO: 79.6 %
NITRITE UR QL STRIP.AUTO: NEGATIVE
NRBC BLD-RTO: 0 /100 WBCS (ref 0–0)
OXYHGB MFR BLDV: 48.6 % (ref 45–75)
PCO2 BLDV: ABNORMAL MM[HG]
PH BLDV: ABNORMAL [PH]
PH UR STRIP.AUTO: 5.5 [PH]
PLATELET # BLD AUTO: 184 X10*3/UL (ref 150–450)
PO2 BLDV: ABNORMAL MM[HG]
POTASSIUM BLDV-SCNC: ABNORMAL MMOL/L
POTASSIUM SERPL-SCNC: 5.8 MMOL/L (ref 3.5–5.3)
PROT SERPL-MCNC: 9.1 G/DL (ref 6.4–8.2)
PROT UR STRIP.AUTO-MCNC: ABNORMAL MG/DL
RBC # BLD AUTO: 4.72 X10*6/UL (ref 4–5.2)
RBC # UR STRIP.AUTO: ABNORMAL /UL
RBC #/AREA URNS AUTO: ABNORMAL /HPF
SAO2 % BLDV: 50 % (ref 45–75)
SODIUM BLDV-SCNC: ABNORMAL MMOL/L
SODIUM SERPL-SCNC: 137 MMOL/L (ref 136–145)
SP GR UR STRIP.AUTO: 1.02
SQUAMOUS #/AREA URNS AUTO: ABNORMAL /HPF
UROBILINOGEN UR STRIP.AUTO-MCNC: ABNORMAL MG/DL
WBC # BLD AUTO: 8 X10*3/UL (ref 4.4–11.3)
WBC #/AREA URNS AUTO: >50 /HPF

## 2024-03-25 PROCEDURE — 2500000004 HC RX 250 GENERAL PHARMACY W/ HCPCS (ALT 636 FOR OP/ED): Performed by: NURSE PRACTITIONER

## 2024-03-25 PROCEDURE — 84132 ASSAY OF SERUM POTASSIUM: CPT

## 2024-03-25 PROCEDURE — 2500000004 HC RX 250 GENERAL PHARMACY W/ HCPCS (ALT 636 FOR OP/ED)

## 2024-03-25 PROCEDURE — 82550 ASSAY OF CK (CPK): CPT | Performed by: NURSE PRACTITIONER

## 2024-03-25 PROCEDURE — 81001 URINALYSIS AUTO W/SCOPE: CPT

## 2024-03-25 PROCEDURE — 85025 COMPLETE CBC W/AUTO DIFF WBC: CPT

## 2024-03-25 PROCEDURE — 83735 ASSAY OF MAGNESIUM: CPT

## 2024-03-25 PROCEDURE — 99223 1ST HOSP IP/OBS HIGH 75: CPT | Performed by: NURSE PRACTITIONER

## 2024-03-25 PROCEDURE — 36415 COLL VENOUS BLD VENIPUNCTURE: CPT

## 2024-03-25 PROCEDURE — 73630 X-RAY EXAM OF FOOT: CPT | Mod: RT

## 2024-03-25 PROCEDURE — 96365 THER/PROPH/DIAG IV INF INIT: CPT

## 2024-03-25 PROCEDURE — 87086 URINE CULTURE/COLONY COUNT: CPT

## 2024-03-25 PROCEDURE — 87070 CULTURE OTHR SPECIMN AEROBIC: CPT

## 2024-03-25 PROCEDURE — 1210000001 HC SEMI-PRIVATE ROOM DAILY

## 2024-03-25 PROCEDURE — 99285 EMERGENCY DEPT VISIT HI MDM: CPT | Performed by: EMERGENCY MEDICINE

## 2024-03-25 PROCEDURE — 87040 BLOOD CULTURE FOR BACTERIA: CPT

## 2024-03-25 PROCEDURE — 84075 ASSAY ALKALINE PHOSPHATASE: CPT

## 2024-03-25 PROCEDURE — 73630 X-RAY EXAM OF FOOT: CPT | Mod: RIGHT SIDE | Performed by: RADIOLOGY

## 2024-03-25 PROCEDURE — 99285 EMERGENCY DEPT VISIT HI MDM: CPT | Mod: 25

## 2024-03-25 PROCEDURE — 83036 HEMOGLOBIN GLYCOSYLATED A1C: CPT | Performed by: NURSE PRACTITIONER

## 2024-03-25 PROCEDURE — 93970 EXTREMITY STUDY: CPT

## 2024-03-25 PROCEDURE — 93971 EXTREMITY STUDY: CPT | Performed by: STUDENT IN AN ORGANIZED HEALTH CARE EDUCATION/TRAINING PROGRAM

## 2024-03-25 PROCEDURE — 2500000001 HC RX 250 WO HCPCS SELF ADMINISTERED DRUGS (ALT 637 FOR MEDICARE OP)

## 2024-03-25 RX ORDER — NADOLOL 40 MG/1
80 TABLET ORAL DAILY
Status: DISCONTINUED | OUTPATIENT
Start: 2024-03-26 | End: 2024-03-29 | Stop reason: HOSPADM

## 2024-03-25 RX ORDER — ACETAMINOPHEN 500 MG
10 TABLET ORAL DAILY PRN
Status: DISCONTINUED | OUTPATIENT
Start: 2024-03-25 | End: 2024-03-29 | Stop reason: HOSPADM

## 2024-03-25 RX ORDER — AMLODIPINE BESYLATE 10 MG/1
10 TABLET ORAL DAILY
Status: DISCONTINUED | OUTPATIENT
Start: 2024-03-26 | End: 2024-03-29 | Stop reason: HOSPADM

## 2024-03-25 RX ORDER — ACETAMINOPHEN 160 MG/5ML
650 SOLUTION ORAL EVERY 4 HOURS PRN
Status: DISCONTINUED | OUTPATIENT
Start: 2024-03-25 | End: 2024-03-29 | Stop reason: HOSPADM

## 2024-03-25 RX ORDER — SODIUM CHLORIDE 9 MG/ML
200 INJECTION, SOLUTION INTRAVENOUS CONTINUOUS
Status: DISCONTINUED | OUTPATIENT
Start: 2024-03-25 | End: 2024-03-26

## 2024-03-25 RX ORDER — POLYETHYLENE GLYCOL 3350 17 G/17G
17 POWDER, FOR SOLUTION ORAL DAILY PRN
Status: DISCONTINUED | OUTPATIENT
Start: 2024-03-25 | End: 2024-03-29 | Stop reason: HOSPADM

## 2024-03-25 RX ORDER — VANCOMYCIN HYDROCHLORIDE 1 G/200ML
1 INJECTION, SOLUTION INTRAVENOUS ONCE
Status: COMPLETED | OUTPATIENT
Start: 2024-03-25 | End: 2024-03-26

## 2024-03-25 RX ORDER — HEPARIN SODIUM 5000 [USP'U]/ML
7500 INJECTION, SOLUTION INTRAVENOUS; SUBCUTANEOUS EVERY 8 HOURS SCHEDULED
Status: DISCONTINUED | OUTPATIENT
Start: 2024-03-25 | End: 2024-03-29 | Stop reason: HOSPADM

## 2024-03-25 RX ORDER — ACETAMINOPHEN 650 MG/1
650 SUPPOSITORY RECTAL EVERY 4 HOURS PRN
Status: DISCONTINUED | OUTPATIENT
Start: 2024-03-25 | End: 2024-03-29 | Stop reason: HOSPADM

## 2024-03-25 RX ORDER — VANCOMYCIN HYDROCHLORIDE 1 G/200ML
1 INJECTION, SOLUTION INTRAVENOUS ONCE
Status: COMPLETED | OUTPATIENT
Start: 2024-03-25 | End: 2024-03-25

## 2024-03-25 RX ORDER — NYSTATIN 100000 [USP'U]/G
1 POWDER TOPICAL 2 TIMES DAILY
Status: DISCONTINUED | OUTPATIENT
Start: 2024-03-25 | End: 2024-03-29 | Stop reason: HOSPADM

## 2024-03-25 RX ORDER — ACETAMINOPHEN 325 MG/1
650 TABLET ORAL EVERY 4 HOURS PRN
Status: DISCONTINUED | OUTPATIENT
Start: 2024-03-25 | End: 2024-03-29 | Stop reason: HOSPADM

## 2024-03-25 RX ORDER — PRAZOSIN HYDROCHLORIDE 2 MG/1
2 CAPSULE ORAL 2 TIMES DAILY
Status: DISCONTINUED | OUTPATIENT
Start: 2024-03-25 | End: 2024-03-29 | Stop reason: HOSPADM

## 2024-03-25 RX ORDER — LISINOPRIL 20 MG/1
40 TABLET ORAL DAILY
Status: DISCONTINUED | OUTPATIENT
Start: 2024-03-26 | End: 2024-03-29 | Stop reason: HOSPADM

## 2024-03-25 RX ADMIN — PIPERACILLIN SODIUM AND TAZOBACTAM SODIUM 4.5 G: 4; .5 INJECTION, SOLUTION INTRAVENOUS at 19:34

## 2024-03-25 RX ADMIN — SODIUM CHLORIDE 100 ML/HR: 9 INJECTION, SOLUTION INTRAVENOUS at 23:03

## 2024-03-25 RX ADMIN — HEPARIN SODIUM 7500 UNITS: 5000 INJECTION INTRAVENOUS; SUBCUTANEOUS at 22:06

## 2024-03-25 RX ADMIN — VANCOMYCIN HYDROCHLORIDE 1 G: 1 INJECTION, SOLUTION INTRAVENOUS at 23:04

## 2024-03-25 RX ADMIN — VANCOMYCIN HYDROCHLORIDE 1 G: 1 INJECTION, SOLUTION INTRAVENOUS at 22:01

## 2024-03-25 RX ADMIN — NYSTATIN 1 APPLICATION: 100000 POWDER TOPICAL at 22:02

## 2024-03-25 ASSESSMENT — ENCOUNTER SYMPTOMS
NAUSEA: 0
CONSTIPATION: 0
HEMATURIA: 0
ABDOMINAL PAIN: 0
FREQUENCY: 0
FLANK PAIN: 0
FEVER: 0
VOMITING: 0
DYSURIA: 0
CHILLS: 0
SHORTNESS OF BREATH: 0
PALPITATIONS: 0
DIARRHEA: 0

## 2024-03-25 ASSESSMENT — PAIN SCALES - GENERAL
PAINLEVEL_OUTOF10: 10 - WORST POSSIBLE PAIN
PAINLEVEL_OUTOF10: 0 - NO PAIN

## 2024-03-25 ASSESSMENT — PAIN DESCRIPTION - LOCATION: LOCATION: LEG

## 2024-03-25 ASSESSMENT — PAIN DESCRIPTION - PAIN TYPE: TYPE: ACUTE PAIN

## 2024-03-25 ASSESSMENT — PAIN - FUNCTIONAL ASSESSMENT: PAIN_FUNCTIONAL_ASSESSMENT: 0-10

## 2024-03-25 NOTE — TELEPHONE ENCOUNTER
I will need to refer pt back to lymphedema clinic. If it is really bad, she should go to ER for evaluation for possible admission to hospital.

## 2024-03-25 NOTE — ED TRIAGE NOTES
"BIB EMS for R LE edema. Pt has chronic lymphedema but has had a decline performing in ADLS. Pt was unable to get out of chair since \"yesterday at 1300\". HX: HTN   "

## 2024-03-25 NOTE — TELEPHONE ENCOUNTER
Pt left vm stating that her lymphedema has gotten worse since last time she saw . pt stated that it is effecting her mobility badly. Pt wants a call back to discuss her options.

## 2024-03-25 NOTE — TELEPHONE ENCOUNTER
Patient daughter states she is going to call EMS and have patient transferred to ED she cannot bend her legs or walk

## 2024-03-26 LAB
ANION GAP SERPL CALC-SCNC: 11 MMOL/L (ref 10–20)
BACTERIA UR CULT: ABNORMAL
BUN SERPL-MCNC: 23 MG/DL (ref 6–23)
CALCIUM SERPL-MCNC: 9.2 MG/DL (ref 8.6–10.6)
CHLORIDE SERPL-SCNC: 111 MMOL/L (ref 98–107)
CO2 SERPL-SCNC: 22 MMOL/L (ref 21–32)
CREAT SERPL-MCNC: 0.92 MG/DL (ref 0.5–1.05)
EGFRCR SERPLBLD CKD-EPI 2021: 66 ML/MIN/1.73M*2
GLUCOSE SERPL-MCNC: 84 MG/DL (ref 74–99)
HOLD SPECIMEN: NORMAL
POTASSIUM SERPL-SCNC: 4.1 MMOL/L (ref 3.5–5.3)
SODIUM SERPL-SCNC: 140 MMOL/L (ref 136–145)

## 2024-03-26 PROCEDURE — 2500000001 HC RX 250 WO HCPCS SELF ADMINISTERED DRUGS (ALT 637 FOR MEDICARE OP): Performed by: INTERNAL MEDICINE

## 2024-03-26 PROCEDURE — 97530 THERAPEUTIC ACTIVITIES: CPT | Mod: GO

## 2024-03-26 PROCEDURE — 99232 SBSQ HOSP IP/OBS MODERATE 35: CPT | Performed by: INTERNAL MEDICINE

## 2024-03-26 PROCEDURE — 2500000004 HC RX 250 GENERAL PHARMACY W/ HCPCS (ALT 636 FOR OP/ED): Performed by: NURSE PRACTITIONER

## 2024-03-26 PROCEDURE — 82374 ASSAY BLOOD CARBON DIOXIDE: CPT | Performed by: NURSE PRACTITIONER

## 2024-03-26 PROCEDURE — 36415 COLL VENOUS BLD VENIPUNCTURE: CPT | Performed by: NURSE PRACTITIONER

## 2024-03-26 PROCEDURE — 97530 THERAPEUTIC ACTIVITIES: CPT | Mod: GP

## 2024-03-26 PROCEDURE — 2500000001 HC RX 250 WO HCPCS SELF ADMINISTERED DRUGS (ALT 637 FOR MEDICARE OP): Performed by: NURSE PRACTITIONER

## 2024-03-26 PROCEDURE — 2500000004 HC RX 250 GENERAL PHARMACY W/ HCPCS (ALT 636 FOR OP/ED): Performed by: INTERNAL MEDICINE

## 2024-03-26 PROCEDURE — 97165 OT EVAL LOW COMPLEX 30 MIN: CPT | Mod: GO

## 2024-03-26 PROCEDURE — 97161 PT EVAL LOW COMPLEX 20 MIN: CPT | Mod: GP

## 2024-03-26 PROCEDURE — 1100000001 HC PRIVATE ROOM DAILY

## 2024-03-26 RX ORDER — CEPHALEXIN 500 MG/1
500 CAPSULE ORAL EVERY 6 HOURS SCHEDULED
Status: DISCONTINUED | OUTPATIENT
Start: 2024-03-26 | End: 2024-03-29 | Stop reason: HOSPADM

## 2024-03-26 RX ORDER — FUROSEMIDE 10 MG/ML
40 INJECTION INTRAMUSCULAR; INTRAVENOUS ONCE
Status: COMPLETED | OUTPATIENT
Start: 2024-03-26 | End: 2024-03-26

## 2024-03-26 RX ADMIN — PRAZOSIN HYDROCHLORIDE 2 MG: 2 CAPSULE ORAL at 09:36

## 2024-03-26 RX ADMIN — NYSTATIN 1 APPLICATION: 100000 POWDER TOPICAL at 09:36

## 2024-03-26 RX ADMIN — AMLODIPINE BESYLATE 10 MG: 10 TABLET ORAL at 09:36

## 2024-03-26 RX ADMIN — NADOLOL 80 MG: 40 TABLET ORAL at 09:36

## 2024-03-26 RX ADMIN — HEPARIN SODIUM 7500 UNITS: 5000 INJECTION INTRAVENOUS; SUBCUTANEOUS at 07:52

## 2024-03-26 RX ADMIN — LISINOPRIL 40 MG: 20 TABLET ORAL at 09:36

## 2024-03-26 RX ADMIN — CEPHALEXIN 500 MG: 500 CAPSULE ORAL at 18:55

## 2024-03-26 RX ADMIN — FUROSEMIDE 40 MG: 10 INJECTION, SOLUTION INTRAMUSCULAR; INTRAVENOUS at 18:55

## 2024-03-26 RX ADMIN — HEPARIN SODIUM 7500 UNITS: 5000 INJECTION INTRAVENOUS; SUBCUTANEOUS at 23:00

## 2024-03-26 RX ADMIN — HEPARIN SODIUM 7500 UNITS: 5000 INJECTION INTRAVENOUS; SUBCUTANEOUS at 14:12

## 2024-03-26 SDOH — SOCIAL STABILITY: SOCIAL INSECURITY: HAS ANYONE EVER THREATENED TO HURT YOUR FAMILY OR YOUR PETS?: NO

## 2024-03-26 SDOH — SOCIAL STABILITY: SOCIAL INSECURITY: WERE YOU ABLE TO COMPLETE ALL THE BEHAVIORAL HEALTH SCREENINGS?: YES

## 2024-03-26 SDOH — SOCIAL STABILITY: SOCIAL INSECURITY: HAVE YOU HAD THOUGHTS OF HARMING ANYONE ELSE?: NO

## 2024-03-26 SDOH — SOCIAL STABILITY: SOCIAL INSECURITY: ARE THERE ANY APPARENT SIGNS OF INJURIES/BEHAVIORS THAT COULD BE RELATED TO ABUSE/NEGLECT?: NO

## 2024-03-26 SDOH — SOCIAL STABILITY: SOCIAL INSECURITY: ARE YOU OR HAVE YOU BEEN THREATENED OR ABUSED PHYSICALLY, EMOTIONALLY, OR SEXUALLY BY ANYONE?: NO

## 2024-03-26 SDOH — SOCIAL STABILITY: SOCIAL INSECURITY: ABUSE: ADULT

## 2024-03-26 SDOH — SOCIAL STABILITY: SOCIAL INSECURITY: DO YOU FEEL UNSAFE GOING BACK TO THE PLACE WHERE YOU ARE LIVING?: NO

## 2024-03-26 SDOH — SOCIAL STABILITY: SOCIAL INSECURITY: DO YOU FEEL ANYONE HAS EXPLOITED OR TAKEN ADVANTAGE OF YOU FINANCIALLY OR OF YOUR PERSONAL PROPERTY?: NO

## 2024-03-26 SDOH — SOCIAL STABILITY: SOCIAL INSECURITY: DOES ANYONE TRY TO KEEP YOU FROM HAVING/CONTACTING OTHER FRIENDS OR DOING THINGS OUTSIDE YOUR HOME?: NO

## 2024-03-26 ASSESSMENT — ACTIVITIES OF DAILY LIVING (ADL)
JUDGMENT_ADEQUATE_SAFELY_COMPLETE_DAILY_ACTIVITIES: YES
ADL_ASSISTANCE: INDEPENDENT
PATIENT'S MEMORY ADEQUATE TO SAFELY COMPLETE DAILY ACTIVITIES?: YES
HEARING - LEFT EAR: FUNCTIONAL
WALKS IN HOME: NEEDS ASSISTANCE
HEARING - RIGHT EAR: FUNCTIONAL
ADEQUATE_TO_COMPLETE_ADL: YES
BATHING: DEPENDENT
FEEDING YOURSELF: INDEPENDENT
DRESSING YOURSELF: NEEDS ASSISTANCE
LACK_OF_TRANSPORTATION: NO
TOILETING: DEPENDENT
GROOMING: NEEDS ASSISTANCE
LACK_OF_TRANSPORTATION: NO

## 2024-03-26 ASSESSMENT — COGNITIVE AND FUNCTIONAL STATUS - GENERAL
STANDING UP FROM CHAIR USING ARMS: A LOT
PERSONAL GROOMING: A LITTLE
HELP NEEDED FOR BATHING: A LITTLE
MOVING TO AND FROM BED TO CHAIR: A LOT
PERSONAL GROOMING: A LITTLE
STANDING UP FROM CHAIR USING ARMS: TOTAL
TOILETING: A LOT
EATING MEALS: A LITTLE
DAILY ACTIVITIY SCORE: 18
TOILETING: A LITTLE
MOVING TO AND FROM BED TO CHAIR: TOTAL
HELP NEEDED FOR BATHING: A LOT
CLIMB 3 TO 5 STEPS WITH RAILING: TOTAL
TURNING FROM BACK TO SIDE WHILE IN FLAT BAD: A LOT
MOBILITY SCORE: 7
WALKING IN HOSPITAL ROOM: TOTAL
WALKING IN HOSPITAL ROOM: TOTAL
DRESSING REGULAR UPPER BODY CLOTHING: A LITTLE
DRESSING REGULAR LOWER BODY CLOTHING: TOTAL
MOVING FROM LYING ON BACK TO SITTING ON SIDE OF FLAT BED WITH BEDRAILS: A LOT
PATIENT BASELINE BEDBOUND: NO
MOVING FROM LYING ON BACK TO SITTING ON SIDE OF FLAT BED WITH BEDRAILS: A LOT
DRESSING REGULAR LOWER BODY CLOTHING: A LOT
CLIMB 3 TO 5 STEPS WITH RAILING: TOTAL
MOBILITY SCORE: 10
DRESSING REGULAR UPPER BODY CLOTHING: A LITTLE
DAILY ACTIVITIY SCORE: 14
TURNING FROM BACK TO SIDE WHILE IN FLAT BAD: TOTAL

## 2024-03-26 ASSESSMENT — LIFESTYLE VARIABLES
TOTAL SCORE: 0
EVER HAD A DRINK FIRST THING IN THE MORNING TO STEADY YOUR NERVES TO GET RID OF A HANGOVER: NO
HAVE YOU EVER FELT YOU SHOULD CUT DOWN ON YOUR DRINKING: NO
SKIP TO QUESTIONS 9-10: 1
AUDIT-C TOTAL SCORE: 0
AUDIT-C TOTAL SCORE: 0
HAVE PEOPLE ANNOYED YOU BY CRITICIZING YOUR DRINKING: NO
HOW OFTEN DO YOU HAVE 6 OR MORE DRINKS ON ONE OCCASION: NEVER
EVER FELT BAD OR GUILTY ABOUT YOUR DRINKING: NO
HOW MANY STANDARD DRINKS CONTAINING ALCOHOL DO YOU HAVE ON A TYPICAL DAY: PATIENT DOES NOT DRINK
PRESCIPTION_ABUSE_PAST_12_MONTHS: NO
SUBSTANCE_ABUSE_PAST_12_MONTHS: NO
HOW OFTEN DO YOU HAVE A DRINK CONTAINING ALCOHOL: NEVER

## 2024-03-26 ASSESSMENT — PAIN - FUNCTIONAL ASSESSMENT
PAIN_FUNCTIONAL_ASSESSMENT: 0-10
PAIN_FUNCTIONAL_ASSESSMENT: 0-10

## 2024-03-26 ASSESSMENT — COLUMBIA-SUICIDE SEVERITY RATING SCALE - C-SSRS
5. HAVE YOU STARTED TO WORK OUT OR WORKED OUT THE DETAILS OF HOW TO KILL YOURSELF? DO YOU INTEND TO CARRY OUT THIS PLAN?: NO
4. HAVE YOU HAD THESE THOUGHTS AND HAD SOME INTENTION OF ACTING ON THEM?: NO

## 2024-03-26 ASSESSMENT — PAIN SCALES - GENERAL
PAINLEVEL_OUTOF10: 0 - NO PAIN
PAINLEVEL_OUTOF10: 0 - NO PAIN

## 2024-03-26 ASSESSMENT — PATIENT HEALTH QUESTIONNAIRE - PHQ9
SUM OF ALL RESPONSES TO PHQ9 QUESTIONS 1 & 2: 0
2. FEELING DOWN, DEPRESSED OR HOPELESS: NOT AT ALL
1. LITTLE INTEREST OR PLEASURE IN DOING THINGS: NOT AT ALL

## 2024-03-26 NOTE — PROGRESS NOTES
"May Pratt is a 73 y.o. female on day 1 of admission presenting with Ulcer of right foot with muscle involvement without evidence of necrosis (CMS/HCC).    Subjective   Patient was seen and examined at bedside. Patient admitted to chronic hx of lymphedema. No fever, no chills, no chest pain    Objective     Physical Exam  Constitutional:  lying in bed with no distress   HEENT: moist oral mucosa  Neck: No JVD  Lungs: Clear to auscultation bilaterally, no wheezing, no rhonchi   Cardiac: regular rate and rhythm, S1 and S2 present, no rubs,  no murmurs, no gallops   Abdomen: bowel sounds present, non tender, non distended  Ext: bilateral lymphedema L> R, with chronic skin changes and small ulcer in the heel with clear liquid draining     Last Recorded Vitals  Blood pressure (!) 144/48, pulse 63, temperature 36.4 °C (97.5 °F), resp. rate 16, height 1.676 m (5' 6\"), weight 143 kg (315 lb), SpO2 99 %.  Intake/Output last 3 Shifts:  I/O last 3 completed shifts:  In: 100 (0.7 mL/kg) [IV Piggyback:100]  Out: 900 (6.3 mL/kg) [Urine:900 (0.2 mL/kg/hr)]  Weight: 142.9 kg     Relevant Results  Scheduled medications  amLODIPine, 10 mg, oral, Daily  cephalexin, 500 mg, oral, q6h VENTURA  furosemide, 40 mg, intravenous, Once  heparin (porcine), 7,500 Units, subcutaneous, q8h VENTURA  lisinopril, 40 mg, oral, Daily  nadolol, 80 mg, oral, Daily  nystatin, 1 Application, Topical, BID  prazosin, 2 mg, oral, BID      Continuous medications     PRN medications  PRN medications: acetaminophen **OR** acetaminophen **OR** acetaminophen, melatonin, polyethylene glycol     Assessment/Plan   May Pratt is a 73 y.o. female with a past medical history of HTN, lymphedema, and MGUS who presented to the ED who presented to the ED for chronic lymphedema, right heel ulcer and inability to take care of himself    PLAN  1. Right heel ulcer/cellulitis with hc of chronic lymphedema       1. Wound care consult pending       2. Start keflex for 7 " days    2.  RUBEN       1. Resolved    3. HTN      1. Continue lisinopril, amlodipine and nadolol     4. Physical deconditioning       1. PT rec SNF     5. Prophylaxis       1. Heparin 7500 mg subcutaneous q 8 hours     Disposition: Pending wound care recs otherwise medically clear for SNF      I spent 35 minutes in the professional and overall care of this patient.      Julianne Wiley, DO

## 2024-03-26 NOTE — CARE PLAN
Problem: Pain  Goal: My pain/discomfort is manageable  Outcome: Progressing     Problem: Safety  Goal: Patient will be injury free during hospitalization  Outcome: Progressing  Goal: I will remain free of falls  Outcome: Progressing     Problem: Daily Care  Goal: Daily care needs are met  Outcome: Progressing     Problem: Psychosocial Needs  Goal: Demonstrates ability to cope with hospitalization/illness  Outcome: Progressing  Goal: Collaborate with me, my family, and caregiver to identify my specific goals  Outcome: Progressing  Flowsheets (Taken 3/26/2024 1028)  Cultural Requests During Hospitalization: n/a  Spiritual Requests During Hospitalization: n/a     Problem: Discharge Barriers  Goal: My discharge needs are met  Outcome: Progressing   The patient's goals for the shift include      The clinical goals for the shift include pt will remain free from injury

## 2024-03-26 NOTE — PROGRESS NOTES
Physical Therapy    Physical Therapy Evaluation & Treatment    Patient Name: May Pratt  MRN: [id number]  Today's Date: 3/26/2024   Time Calculation  Start Time: 1136  Stop Time: 1233  Time Calculation (min): 57 min    Assessment/Plan   PT Assessment  PT Assessment Results: Decreased strength, Decreased range of motion, Decreased endurance, Impaired balance, Decreased mobility, Pain  Rehab Prognosis: Good  Evaluation/Treatment Tolerance: Patient tolerated treatment well  Medical Staff Made Aware: Yes  End of Session Communication: Bedside nurse  Assessment Comment: Pt is a 73 year old female who presents for Right LE edema, progressive decline and unable to get out of chair at home and right heel ulcer. Pt demonstrates decreased strength, endurance, balance leading to impairments in functional mobility. Pt would benefit from continued therapy during and following hospital stay  End of Session Patient Position: Bed, 3 rail up, Alarm off, not on at start of session   IP OR SWING BED PT PLAN  Inpatient or Swing Bed: Inpatient  PT Plan  Treatment/Interventions: Bed mobility, Transfer training, Stair training, Gait training, Balance training, Neuromuscular re-education, Strengthening, Range of motion, Endurance training, Therapeutic exercise, Therapeutic activity, Home exercise program, Postural re-education, Positioning  PT Plan: Skilled PT  PT Frequency: 3 times per week  PT Discharge Recommendations: Moderate intensity level of continued care      Subjective     General Visit Information:  General  Reason for Referral: Right LE edema, progressive decline and unable to get out of chair at home. Right heel ulcer  Past Medical History Relevant to Rehab: HTN, Lymphedema and MGUS, thyroid bx. (x-ray negative for OM)  Family/Caregiver Present: No  Co-Treatment: OT  Co-Treatment Reason: Seen with PT to Maximize pt's functional safety/performance in session; pt requires heavy skilled assist x2 to safely mobilize.  Prior  to Session Communication: Bedside nurse (RN cleared for therapy)  Patient Position Received: Bed, 3 rail up, Alarm off, not on at start of session  General Comment: Pt is pleasant, cooperative, and willing to participate in therapy. Pt educated heavily throughout session on rehab during and following hospital stay and potential DME needs  Home Living:  Home Living  Type of Home: House  Lives With: Spouse  Home Adaptive Equipment: Cane  Home Layout: One level  Home Access: Stairs to enter without rails  Entrance Stairs-Number of Steps: 1  Bathroom Shower/Tub: Tub/shower unit  Bathroom Toilet: Standard  Bathroom Equipment:  (shower chair)  Home Living Comments: laundry in basement  Prior Level of Function:  Prior Function Per Pt/Caregiver Report  Level of [place]: Independent with ADLs and functional transfers (pt reports increased difficulty over time, pt occassionally does laundry in basement, seated rest breaks required for tasks that take extended time)  Receives Help From:  ( assists with laundry)  ADL Assistance: Independent (requires increased time and increased difficulty especially this past week)  Ambulatory Assistance: Independent (cane)  Prior Function Comments: not currently driving due to increased lymphedema R LE, no hx of falls  Precautions:  Precautions  Medical Precautions: Fall precautions  Vital Signs:       Objective   Pain:  Pain Assessment  Pain Assessment: 0-10  Pain Score: 0 - No pain (at rest; pt c/o pain LLE > right LE with attempted mobilization did not provide a rating)  Cognition:  Cognition  Overall Cognitive Status: Within Functional Limits  Orientation Level: Oriented X4    General Assessments:     Sensation  Sensation Comment: pt reports numbness and tingling in B/L plantar surfaces of feet, intact sensation to light touch and proprioception    Static Sitting Balance  Static Sitting-Balance Support: Bilateral upper extremity supported, Feet supported  Static Sitting-Level  of Assistance:  (SBA)  Dynamic Sitting Balance  Dynamic Sitting-Balance Support: Feet supported, Bilateral upper extremity supported  Dynamic Sitting-Comments: CGA    Static Standing Balance  Static Standing-Balance Support: Bilateral upper extremity supported (arm in arm assistance)  Static Standing-Comment/Number of Minutes: max A x2  Dynamic Standing Balance  Dynamic Standing-Balance Support: Bilateral upper extremity supported (arm in arm assistance)  Dynamic Standing-Comments: max A x2  Functional Assessments:  Bed Mobility  Bed Mobility: Yes  Bed Mobility 1  Bed Mobility 1: Supine to sitting  Level of Assistance 1: Maximum assistance, Maximum verbal cues (x2)  Bed Mobility Comments 1: VCs, HOB elevated, increased time to perform, assistance for upper body and LEs  Bed Mobility 2  Bed Mobility  2: Sitting to supine  Level of Assistance 2: Dependent, Maximum verbal cues (x2)  Bed Mobility Comments 2: VCs, requires assistance for upper body and LEs  Bed Mobility 3  Bed Mobility 3: Rolling right, Rolling left  Level of Assistance 3: Maximum assistance, Maximum verbal cues  Bed Mobility 4  Bed Mobility 4: Scooting (to boost up in bed with draw sheet)  Level of Assistance 4: Dependent (x2)    Transfers  Transfer: Yes  Transfer 1  Transfer From 1: Sit to, Stand to  Transfer to 1: Stand, Sit  Technique 1: Sit to stand, Stand to sit  Transfer Device 1:  (arm in arm assistance)  Transfer Level of Assistance 1: Maximum assistance, Maximum verbal cues (x2)  Trials/Comments 1: x2 trials, increased time to perform, cues for hip and knee extension, B/L knee blocking, use of draw sheet to assist in hip extension, decreased control with descent from stand    Ambulation/Gait Training  Ambulation/Gait Training Performed: Yes  Ambulation/Gait Training 1  Surface 1: Level tile  Device 1:  (arm in arm assistance)  Assistance 1: Maximum assistance, Maximum verbal cues (x2)  Comments/Distance (ft) 1: 1 step with L LE with pt pivoting  heel/ toe on floor, unable to mobilize R LE    Stairs  Stairs: No  Extremity/Trunk Assessments:  RLE   RLE :  (>/= 3+/5 observed via function)  LLE   LLE :  (>/= 3+/5 observed via function)  Treatments:  Therapeutic Activity  Therapeutic Activity Performed: Yes  Therapeutic Activity 1: Pt sat on EOB for 10-12 minutes with CGA-SBA with B/L UE support and feet supported, Pt performed 2 trials of sit to stand with max A x2 and arm in arm assistance. Pt repositioned at end of session with LEs elevated on pillows with heels floating  Therapeutic Activity 2: Pt educated on role of PT in hospital and following hospital stay. Pt educated on the importance of mobility with activity and symptom monitoring. Pt educated on moderate intensity theray following hospital stay and need for potential DME    Bed Mobility  Bed Mobility: Yes  Bed Mobility 1  Bed Mobility 1: Supine to sitting  Level of Assistance 1: Maximum assistance, Maximum verbal cues (x2)  Bed Mobility Comments 1: VCs, HOB elevated, increased time to perform, assistance for upper body and LEs  Bed Mobility 2  Bed Mobility  2: Sitting to supine  Level of Assistance 2: Dependent, Maximum verbal cues (x2)  Bed Mobility Comments 2: VCs, requires assistance for upper body and LEs  Bed Mobility 3  Bed Mobility 3: Rolling right, Rolling left  Level of Assistance 3: Maximum assistance, Maximum verbal cues  Bed Mobility 4  Bed Mobility 4: Scooting (to boost up in bed with draw sheet)  Level of Assistance 4: Dependent (x2)    Ambulation/Gait Training  Ambulation/Gait Training Performed: Yes  Ambulation/Gait Training 1  Surface 1: Level tile  Device 1:  (arm in arm assistance)  Assistance 1: Maximum assistance, Maximum verbal cues (x2)  Comments/Distance (ft) 1: 1 step with L LE with pt pivoting heel/ toe on floor, unable to mobilize R LE  Transfers  Transfer: Yes  Transfer 1  Transfer From 1: Sit to, Stand to  Transfer to 1: Stand, Sit  Technique 1: Sit to stand, Stand to  sit  Transfer Device 1:  (arm in arm assistance)  Transfer Level of Assistance 1: Maximum assistance, Maximum verbal cues (x2)  Trials/Comments 1: x2 trials, increased time to perform, cues for hip and knee extension, B/L knee blocking, use of draw sheet to assist in hip extension, decreased control with descent from stand  Outcome Measures:  [facility] Basic Mobility  Turning from your back to your side while in a flat bed without using bedrails: A lot  Moving from lying on your back to sitting on the side of a flat bed without using bedrails: Total  Moving to and from bed to chair (including a wheelchair): Total  Standing up from a chair using your arms (e.g. wheelchair or bedside chair): Total  To walk in hospital room: Total  Climbing 3-5 steps with railing: Total  Basic Mobility - Total Score: 7    Encounter Problems       Encounter Problems (Active)       Mobility       STG - Patient will ambulate >5 ft with mod A and LRD       Start:  03/26/24    Expected End:  04/09/24            Pt will be able to sit on EOB for ~25 minutes with SBA and no signs of fatigue or SOB        Start:  03/26/24    Expected End:  04/09/24               PT Transfers       STG - Patient will perform bed mobility with mod A        Start:  03/26/24    Expected End:  04/09/24            STG - Patient will transfer sit to and from stand with mod A and LRD       Start:  03/26/24    Expected End:  04/09/24                   Education Documentation  Precautions, taught by An Ricks PT at 3/26/2024  2:59 PM.  Learner: Patient  Readiness: Acceptance  Method: Explanation  Response: Verbalizes Understanding  Comment: bed mobility, transfers, gait, role of PT, therapy following hospital stay    Mobility Training, taught by An Ricks PT at 3/26/2024  2:59 PM.  Learner: Patient  Readiness: Acceptance  Method: Explanation  Response: Verbalizes Understanding  Comment: bed mobility, transfers, gait, role of PT, therapy following hospital  stay    Education Comments  No comments found.      03/26/24 at 3:01 PM - An Ricks, PT

## 2024-03-26 NOTE — PROGRESS NOTES
03/26/24 1525   Discharge Planning   Living Arrangements Spouse/significant other   Support Systems Spouse/significant other;Children   Assistance Needed assist with bathing   Type of Residence Private residence   Number of Stairs to Enter Residence 1   Number of Stairs Within Residence 10   Home or Post Acute Services Post acute facilities (Rehab/SNF/etc)   Type of Post Acute Facility Services Skilled nursing   Patient expects to be discharged to: new SNF   Does the patient need discharge transport arranged? Yes   RoundTrip coordination needed? Yes   Has discharge transport been arranged? No   Financial Resource Strain   How hard is it for you to pay for the very basics like food, housing, medical care, and heating? Not hard   Housing Stability   In the last 12 months, was there a time when you were not able to pay the mortgage or rent on time? N   In the last 12 months, was there a time when you did not have a steady place to sleep or slept in a shelter (including now)? N   Transportation Needs   In the past 12 months, has lack of transportation kept you from medical appointments or from getting medications? no   In the past 12 months, has lack of transportation kept you from meetings, work, or from getting things needed for daily living? No   Patient Choice   Provider Choice list and CMS website (https://medicare.gov/care-compare#search) for post-acute Quality and Resource Measure Data were provided and reviewed with: Patient     Transitional Care Coordination Progress Note:  Patient discussed during interdisciplinary rounds.   Team members present: EITAN NOONAN  Plan per Medical/Surgical team: lymphedema  Payor: [employer]  Discharge disposition: new SNF  Potential Barriers: none  ADOD: 2 days    Previous Home Care: NA  DME: cane  Pharmacy: mail order, [facility] on [facility] for short term meds  Falls: Denies  PCP:  Dr Melissa; last seen Dec 2023    Met with patient at bedside, provided introduction of self and role.  Patient states she requires assist for bathing. She is no longer able to get herself in/out of tub so she has been washing herself at sink daily. Patient states she feels safe at home. Patient states she has been driving herself to appointments. Patient states no concerns obtaining/affording medications. Patient states no social/financial concerns. Discussed with patient PT recommendations for moderate intensity therapy at discharge. Discussed differences between acute rehab, skilled nursing facility, home healthcare and outpatient therapy. Patient in agreement with SNF. Educated patient regarding freedom of choice and provided financial disclosure. Facility list provided. Patient states she wants to discuss choices with her daughter. MD updated. Will continue to monitor for discharge planning needs.     Rita FRY, RN  Transitional Care Coordinator (TCC)  [phone]

## 2024-03-26 NOTE — H&P
History Of Present Illness  May Pratt is a 73 y.o. female with a past medical history of HTN, lymphedema, and MGUS who presented to the ED who presented to the ED for RLE edema. She reports having lymphedema, but has had a progressive decline in being able to perform her ADLs. She reports not being able to get out of her chair since yesterday around 1pm. She denies any fever, chills, chest pain, shortness of breath, nausea, vomiting, or urinary complaints. Notably, she was treated with topical gentamycin and bactrim as an outpatient for multiple courses. She reports intentionally decreasing her oral intake in an effort to decrease the amount of seeping from her legs.      Past Medical History  Past Medical History:   Diagnosis Date    Hypertension     Lymphedema        Surgical History  Past Surgical History:   Procedure Laterality Date    US GUIDED THYROID BIOPSY  8/22/2023    US GUIDED THYROID BIOPSY 8/22/2023 [facility]    US GUIDED THYROID BIOPSY  8/22/2023    US GUIDED THYROID BIOPSY 8/22/2023 [facility]        Social History  She reports that she has never smoked. She has never used smokeless tobacco. She reports that she does not drink alcohol and does not use drugs.    Family History  No family history on file.     Allergies  Patient has no known allergies.    Review of Systems   Constitutional:  Negative for chills and fever.   Respiratory:  Negative for shortness of breath.    Cardiovascular:  Negative for chest pain and palpitations.   Gastrointestinal:  Negative for abdominal pain, constipation, diarrhea, nausea and vomiting.   Genitourinary:  Negative for dysuria, flank pain, frequency, hematuria and urgency.   All other systems reviewed and are negative.       Physical Exam  Vitals reviewed.   Constitutional:       Appearance: She is obese.   HENT:      Head: Normocephalic and atraumatic.      Mouth/Throat:      Mouth: Mucous membranes are dry.   Cardiovascular:      Rate and Rhythm: Normal rate and  "regular rhythm.      Heart sounds: Normal heart sounds.   Pulmonary:      Effort: Pulmonary effort is normal.      Breath sounds: Normal air entry.   Abdominal:      General: Bowel sounds are normal.      Palpations: Abdomen is soft.      Tenderness: There is no abdominal tenderness.   Musculoskeletal:         General: No deformity.      Right lower leg: Edema present.      Left lower leg: Edema present.   Skin:     General: Skin is warm and dry.      Findings: Lesion present.      Comments: See wound images   Neurological:      General: No focal deficit present.      Mental Status: She is alert and oriented to person, place, and time.   Psychiatric:         Mood and Affect: Mood normal.         Behavior: Behavior normal.          Last Recorded Vitals  Blood pressure 150/70, pulse 76, temperature 36.9 °C (98.4 °F), temperature source Temporal, resp. rate 18, height 1.676 m (5' 6\"), weight 143 kg (315 lb), SpO2 97 %.    Relevant Results      Lab Results   Component Value Date    WBC 8.0 03/25/2024    HGB 11.6 (L) 03/25/2024    HCT 35.5 (L) 03/25/2024    MCV 75 (L) 03/25/2024     03/25/2024     Lab Results   Component Value Date    GLUCOSE 88 03/25/2024    CALCIUM 10.9 (H) 03/25/2024     03/25/2024    K 5.8 (H) 03/25/2024    CO2 25 03/25/2024     03/25/2024    BUN 32 (H) 03/25/2024    CREATININE 1.13 (H) 03/25/2024     Lab Results   Component Value Date    HGBA1C 5.8 12/16/2022     Vascular US lower extremity venous duplex bilateral  Narrative: Interpreted By:  Tyson Burrows,  and Casey Corbett   STUDY:  [facility] US LOWER EXTREMITY VENOUS DUPLEX BILATERAL;  3/25/2024 8:23 pm      INDICATION:  Signs/Symptoms:Bilateral lower extremity edema, right greater than  left.      COMPARISON:  Right lower extremity venous duplex ultrasound, 09/06/2016      ACCESSION NUMBER(S):  [id number]      ORDERING CLINICIAN:  GRADY THAPA      TECHNIQUE:  Vascular ultrasound of the bilateral lower extremities was " performed.  Real-time compression views as well as Gray scale, color Doppler and  spectral Doppler waveform analysis was performed.      FINDINGS:  Evaluation of the visualized portions of the bilateral common femoral  vein, proximal, mid, and distal femoral vein, and popliteal vein were  performed.  Evaluation of the visualized portions of the  posterior  tibial and peroneal veins were also performed.      Limitations:  Body habitus      Right:  The external iliac vein, common femoral vein, and proximal femoral  vein demonstrate normal compressibility. Evaluation of  compressibility is otherwise limited in the remainder of the left  lower extremity. There is intact color Doppler and spectral Doppler  venous flow demonstrating normal respiratory variability. The  posterior tibial and peroneal veins are not visualized.      Left:  The external iliac vein, common femoral vein, proximal femoral vein,  and mid femoral vein demonstrate normal compressibility. Evaluation  of compressibility is otherwise limited in the remainder of the left  lower extremity. There is intact color Doppler and spectral Doppler  venous flow demonstrating normal respiratory variability. The  posterior tibial and peroneal veins are not definitively visualized.  Therefore, there is no ultrasonographic evidence for deep vein  thrombosis within the evaluated veins.      Impression: Significantly limited evaluation due to body habitus. Within this  limitation, there is no sonographic evidence for deep vein thrombosis  within the evaluated veins of the bilateral lower extremity.      I personally reviewed the images/study and I agree with the findings  as stated by radiology resident Dr. Chelel Peña. This study was  interpreted at [place], [place].      MACRO:  None      Signed by: Tyson Burrows 3/25/2024 8:57 PM  Dictation workstation:   [id number]  XR foot right 3+ views  Narrative: STUDY:  Foot  Radiographs; 3/25/2024 at 7:44 PM.  INDICATION:  Assess for wound depth for osteomyelitis.  COMPARISON:  None available.  ACCESSION NUMBER(S):  [id number]  ORDERING CLINICIAN:  VALERIE ROQUE  TECHNIQUE:  Three view(s) of the right foot (four images).  FINDINGS:    There is no displaced fracture.  There is osteopenia and mild  degenerative changes.  There is no cortical erosion or periosteal  reaction.  Lisfranc joint demonstrates normal alignment.  Midfoot and  subtalar joint alignment maintained with moderate plantar calculus  spur.    There is ulceration along the hindfoot plantar margin measuring 1.9 cm  with soft tissue swelling.  There is no definitive evidence of acute  osteomyelitis.  There is diffuse soft tissue swelling of the foot  extending in the medial aspect of the ankle.  Dystrophic  calcifications are demonstrated along the posterior medial aspect of  the tibia.  Impression: Ulceration plantar margin the hindfoot.  Soft tissue swelling  concerning for cellulitis without radiographic evidence of acute  osteomyelitis.  Diffuse soft tissue swelling of the foot and ankle  with dystrophic calcifications along the posterior medial aspect of  the tibia.  Osteopenia and mild degenerative changes without evidence  of described.  Signed by Corey Sexton DO    ED Medication Administration from 03/25/2024 1645 to 03/25/2024 2201         Date/Time Order Dose Route Action Action by     03/25/2024 1850 EDT vancomycin (Vancocin) 2000 mg/500 ml in D5W 500 mL IV piggyback 2 g -- intravenous Canceled Entry DEBRA Light     03/25/2024 1934 EDT piperacillin-tazobactam-dextrose (Zosyn) IV 4.5 g 4.5 g intravenous New Bag DEBRA Light     03/25/2024 2004 EDT piperacillin-tazobactam-dextrose (Zosyn) IV 4.5 g 0 g intravenous Stopped DEBRA Light               Assessment/Plan   Principal Problem:    Ulcer of right foot with muscle involvement without evidence of necrosis (CMS/HCC)      #Right heel ulcer  #Lymphedema  -XR without  evidence of OM, no gas  -Wound culture  -Vancomycin, Zosyn started in ED, continue  -Afebrile, no leukocytosis  -Low suspicion for SIRS/sepsis at thist isacc  -Wound consult  -Check a1c    #Rhabdomyolysis  -CK 1194  -NS @ 200 mL/hr  -Follow RFP    #RUBEN  -Suspect prerenal ISO dehydration  -Avoid nephrotoxic agents where possible  -Renal dosing where indicated  -Follow RFP    #r/o Hyperkalemia  -K+ 5.8, specimen moderately hemolyzed  -Doubt true hyperkalemia  -Recheck K+  -Treat if indicated    #Isolated pyuria and hematuria  -No urinary symptoms  -Patient has been taking Bactrim  -UTI unlikely, however abx for #1 would also cover for UTI    #HTN  -Initially hypertensive, resolved without direct intervention  -Continue home medications           Khanh Berry, APRN-CNP

## 2024-03-26 NOTE — ED PROVIDER NOTES
CC: Leg Swelling     HPI:  This is a 73-year-old female with past medical history of lymphedema, hypertension who presents to the emergency department with right lower extremity pain and bilateral leg swelling.  Patient states she has a history of lymphedema but has caused her much difficulty especially over the past year.  She states recently it has been getting harder and harder for her to walk.  She recently over the past couple of days has became unable to stand from a sitting position as she used to.  She states that it usually takes her about an hour or 2 to get up however she is usually able to gather the strength.  She states when she gets walking she has significant pain in her right foot that prevents her from ambulating normally.  She is unable to see her feet or care for them however she does have home health care.  She typically gets around with a walker.  She does state that her legs generally feel warm however states that it is both legs.  She denies any fevers, chills, or infectious symptoms.  No nausea or vomiting.  No other symptoms at this time.    Limitations to history: None  Independent historian(s): Patient's daughter at bedside  Records Reviewed: Recent available ED and inpatient notes reviewed in EMR.    PMHx/PSHx:  Per HPI.   - has a past medical history of Hypertension and Lymphedema.  - has a past surgical history that includes US guided thyroid biopsy (8/22/2023) and US guided thyroid biopsy (8/22/2023).    Medications:  Reviewed in EMR. See EMR for complete list of medications and doses.    Allergies:  Patient has no known allergies.    Social History:  - Tobacco:  reports that she has never smoked. She has never used smokeless tobacco.   - Alcohol:  reports no history of alcohol use.   - Illicit Drugs:  reports no history of drug use.     ROS:  Per HPI.       ???????????????????????????????????????????????????????????????  Triage Vitals:  T 36.8 °C (98.2 °F)  HR 75  BP (!) 185/68   RR 18  O2 97 %      Physical Exam    General: Patient resting comfortably in bed, no acute distress, breathing easily, appropriately conversational without confusion or gross mental status changes.  Head: Normocephalic. Atraumatic.  Neck:  FROM. No gross masses.   Eyes: EOMI. No scleral icterus or injection.  ENT: Moist mucous membranes, no apparent trauma or lesions.  CV: Regular rhythm. No murmurs, rubs, gallops appreciated. 2+ radial and DP pulses bilaterally.  Resp: Clear to auscultation bilaterally. No respiratory distress.   GI: Soft, non-distended.  No tenderness with palpation.    EXT: Bilateral lower extremity edema consistent with lymphedema.  There is a large wound on the heel and plantar surface of the right foot.  There are erythematous changes to the distal lower extremity from the ankle up to the mid calf.  Please refer to the images.  Skin: Skin changes consistent with lymphedema in the bilateral lower extremities.  There is a superficial abrasion on the bilateral inner thighs.  There is erythema and purulent drainage within the pannus.  Please refer to the images.  Neuro: Alert and oriented.  Cranial nerves II-XII grossly intact.  No focal neurological deficits.  Equal motor strength in bilateral upper and lower extremities.  Sensation intact throughout.  Speech fluent.  Psych: Appropriate mood and behavior, converses and responds appropriately.                              ???????????????????????????????????????????????????????????????  Labs:   Labs Reviewed   TISSUE/WOUND CULTURE/SMEAR - Abnormal       Result Value    Gram Stain No polymorphonuclear leukocytes seen (*)     Gram Stain   (*)     Value: (3+) Moderate Mixed Gram positive and Gram negative bacteria   COMPREHENSIVE METABOLIC PANEL - Abnormal    Glucose 88      Sodium 137      Potassium 5.8 (*)     Chloride 104      Bicarbonate 25      Anion Gap 14      Urea Nitrogen 32 (*)     Creatinine 1.13 (*)     eGFR 52 (*)     Calcium 10.9 (*)      Albumin 3.8      Alkaline Phosphatase 86      Total Protein 9.1 (*)     AST 36      Bilirubin, Total 0.7      ALT 17     CBC WITH AUTO DIFFERENTIAL - Abnormal    WBC 8.0      nRBC 0.0      RBC 4.72      Hemoglobin 11.6 (*)     Hematocrit 35.5 (*)     MCV 75 (*)     MCH 24.6 (*)     MCHC 32.7      RDW 15.2 (*)     Platelets 184      Neutrophils % 79.6      Immature Granulocytes %, Automated 1.6 (*)     Lymphocytes % 9.9      Monocytes % 8.5      Eosinophils % 0.2      Basophils % 0.2      Neutrophils Absolute 6.38 (*)     Immature Granulocytes Absolute, Automated 0.13      Lymphocytes Absolute 0.79 (*)     Monocytes Absolute 0.68      Eosinophils Absolute 0.02      Basophils Absolute 0.02     URINALYSIS WITH REFLEX CULTURE AND MICROSCOPIC - Abnormal    Color, Urine Yellow      Appearance, Urine Ex.Turbid (*)     Specific Gravity, Urine 1.025      pH, Urine 5.5      Protein, Urine 30 (1+) (*)     Glucose, Urine Normal      Blood, Urine 0.03 (TRACE) (*)     Ketones, Urine TRACE (*)     Bilirubin, Urine NEGATIVE      Urobilinogen, Urine 2 (1+) (*)     Nitrite, Urine NEGATIVE      Leukocyte Esterase, Urine 500 Kelsey/µL (*)    MICROSCOPIC ONLY, URINE - Abnormal    WBC, Urine >50 (*)     RBC, Urine 11-20 (*)     Squamous Epithelial Cells, Urine 1-9 (SPARSE)      Mucus, Urine FEW     HEMOGLOBIN A1C - Abnormal    Hemoglobin A1C 5.7 (*)     Estimated Average Glucose 117      Narrative:     Diagnosis of Diabetes-Adults  Non-Diabetic: < or = 5.6%  Increased risk for developing diabetes: 5.7-6.4%  Diagnostic of diabetes: > or = 6.5%    Monitoring of Diabetes  Age (y)....................... Therapeutic Goal (%)  Adults: >18.........................<7.0  Pediatrics: 13-18...................<7.5  Pediatrics: 7-12....................<8.0  Pediatrics: 0-6..................... 7.5-8.5    American Diabetes Association. Diabetes Care 33(S1), Jan 2010       CREATINE KINASE - Abnormal    Creatine Kinase 1,194 (*)    BLOOD GAS VENOUS FULL  PANEL UNSOLICITED - Abnormal    POCT pH, Venous        POCT pCO2, Venous        POCT pO2, Venous        POCT SO2, Venous 50      POCT Oxy Hemoglobin, Venous 48.6      POCT Hematocrit Calculated, Venous 35.0 (*)     POCT Sodium, Venous        POCT Potassium, Venous        POCT Chloride, Venous        POCT Ionized Calicum, Venous        POCT Glucose, Venous        POCT Lactate, Venous        POCT Base Excess, Venous        POCT HCO3 Calculated, Venous        POCT Hemoglobin, Venous 11.8 (*)     POCT Anion Gap, Venous        Patient Temperature 37.0     BLOOD CULTURE - Normal    Blood Culture Loaded on Instrument - Culture in progress     BLOOD CULTURE - Normal    Blood Culture Loaded on Instrument - Culture in progress     MAGNESIUM - Normal    Magnesium 2.20     URINE CULTURE   TISSUE/WOUND CULTURE/SMEAR   URINALYSIS WITH REFLEX CULTURE AND MICROSCOPIC    Narrative:     The following orders were created for panel order Urinalysis with Reflex Culture and Microscopic.  Procedure                               Abnormality         Status                     ---------                               -----------         ------                     Urinalysis with Reflex C...[[id number]]  Abnormal            Final result               Extra Urine Gray Tube[[id number]]                            In process                   Please view results for these tests on the individual orders.   EXTRA URINE GRAY TUBE   BASIC METABOLIC PANEL        Imaging:   Vascular US lower extremity venous duplex bilateral   Final Result   Significantly limited evaluation due to body habitus. Within this   limitation, there is no sonographic evidence for deep vein thrombosis   within the evaluated veins of the bilateral lower extremity.        I personally reviewed the images/study and I agree with the findings   as stated by radiology resident Dr. Chelle Peña. This study was   interpreted at [facility],   [place],  [place].        MACRO:   None        Signed by: Tyson Burrows 3/25/2024 8:57 PM   Dictation workstation:   [id number]      XR foot right 3+ views   Final Result   Ulceration plantar margin the hindfoot.  Soft tissue swelling   concerning for cellulitis without radiographic evidence of acute   osteomyelitis.  Diffuse soft tissue swelling of the foot and ankle   with dystrophic calcifications along the posterior medial aspect of   the tibia.  Osteopenia and mild degenerative changes without evidence   of described.   Signed by Corey Sexton DO           EKG:  None    MDM:  This is a 73-year-old female who presents to the emergency department with right lower extremity pain and bilateral leg swelling.  She is hemodynamically stable and in no distress.  Her physical exam reveals a right foot wound, inner thigh wounds, and skin changes consistent with intertrigo of the pannus.  Concern at this time for cellulitis, deep soft tissue infection, and osteomyelitis.  Do not suspect necrotizing fasciitis at this time given the patient's overall clinical appearance and stable vital signs, though crepitus and other findings consistent with such diagnosis would be difficult to assess given her lymphedema.  Sepsis labs are obtained including blood cultures and the patient is started on antibiotics.  An x-ray is obtained to rule out osteomyelitis which is negative for infection deep to the bone.  Wound cultures are obtained of the open foot wound.  There is no leukocytosis and a mild microcytic anemia with a hemoglobin 11.6.  Metabolic panel shows imaging elevated potassium with marked hemolysis of 5.8.  There is an RUBEN with a creatinine of 1.13.  A urinalysis shows significant amount of leukocyte esterase and greater than 50 WBCs.  Patient is given vancomycin and Zosyn.  She is also ordered nystatin cream for an intertrigo infection in her pannus.  The wound on the inner thigh appears to be superficial abrasions likely from  skin on skin rubbing.  At this time she is appropriate for admission to the hospital for further treatment.  Admissions coordinator contacted and the patient is accepted to medicine.  She remained hemodynamically stable and awaits transfer to regular nursing floor.    Patient seen by and discussed with the attending emergency medicine physician.     ED Course:  Diagnoses as of 03/26/24 0108   Ulcer of right foot with muscle involvement without evidence of necrosis (CMS/HCC)   Cellulitis of right lower extremity   Intertrigo       Social Determinants Limiting Care:  None identified    Disposition:  Admit-medicine    João Fountain DO   Emergency Medicine PGY-2  [facility]      Procedures ? SmartLinks last updated 3/26/2024 1:08 AM        João Fountain DO  Resident  03/26/24 0123

## 2024-03-26 NOTE — PROGRESS NOTES
Occupational Therapy    Evaluation and Treatment    Patient Name: May Pratt  MRN: [id number]  Today's Date: 3/26/2024  Time Calculation  Start Time: 1136  Stop Time: 1233  Time Calculation (min): 57 min    Assessment  IP OT Assessment  End of Session Communication: Bedside nurse  End of Session Patient Position: Bed, 3 rail up, Alarm off, not on at start of session  Plan:  Treatment Interventions: ADL retraining, UE strengthening/ROM, Endurance training, Patient/family training, Equipment evaluation/education, Compensatory technique education, Continued evaluation (Positioning)  OT Frequency: 3 times per week  OT Discharge Recommendations: Moderate intensity level of continued care  OT - OK to Discharge:  (OT Evaluation completed.)    Subjective   Current Problem:  1. Ulcer of right foot with muscle involvement without evidence of necrosis (CMS/HCC)        2. Cellulitis of right lower extremity        3. Intertrigo          General:  Reason for Referral: Right LE edema, progressive decline and unable to get out of chair at home. Right heel ulcer  Past Medical History Relevant to Rehab: HTN, Lymphedema and MGUS, thyroid bx. (X-Ray: Negative for Osteomyelitis, no gas)  Co-Treatment: PT  Co-Treatment Reason: Seen with PT to Maximize pt's functional safety/performance in session; pt requires heavy skilled assist x2 to safely mobilize.  Prior to Session Communication: Bedside nurse (RN cleared pt for therapy.)  Patient Position Received: Bed, 3 rail up, Alarm off, not on at start of session  Family/Caregiver Present: No  General Comment: Pt received in supine, pleaseant and cooperative, agreeable to therapy. Pt with many questions re: rehab process and how she might be able to access needed AE/DME upon D/C. Pt motivated for therapy.   Precautions:  Medical Precautions: Fall precautions       Pain:  Pain Assessment  Pain Assessment: 0-10  Pain Score: 0 - No pain (at rest; pt c/o pain LLE > right LE with attempted  "mobilization, however, not rated.)           Objective   Cognition:  Overall Cognitive Status: Within Functional Limits           Home Living:  Type of Home: House  Lives With: Spouse  Home Adaptive Equipment: Cane  Home Layout: One level  Home Access: Stairs to enter without rails  Entrance Stairs-Number of Steps: 1  Bathroom Shower/Tub: Tub/shower unit  Bathroom Toilet: Standard  Bathroom Equipment: Shower chair with back  Home Living Comments: Laundry in basement.   Prior Function:  Level of [place]: Independent with ADLs and functional transfers (Pt reports she was making meals wtih seated rest breaks, would sometimes go into basement to do laundry with reportedly difficulty managing stairs.)  Receives Help From:  (Spouse assists with laundry and some home tasks but pt stated, \"I was trying to keep up with a lot of things at home the best I could\".)  ADL Assistance:  (Pt reports she was independent but with increasing difficulty, has been wearing skirs. Stated, \"I have trouble washing my legs and I can't see my feet or wash my legs/feet anymore\".)  Ambulatory Assistance: Independent (with SPC; pt endorses \"shuffling\" household distances at baseline due to LE edema, denies falls. Pt does not own a walker at baseline.)  Prior Function Comments: Pt reports she was driving until approximately 3 months ago -- states that she stopped driving due to worsening RLE edema and limitations with operating pedals. (Pt reports she had been involved with going to a lymphdedma clinic, however, unclear when it last was that she went there. Pt reports she has never had any OT/PT.)       ADL:  LE Dressing Assistance: Total  LE Dressing Deficit: Don/doff L sock, Don/doff R sock (In supine -- limited by B/L LE edema.)  Toileting Assistance with Device:  (Anticipate at least max assist given pt's current deficits and LE edema. Pt will benefit from a wide B/S commode when safe to begine transfers.)  Activity Tolerance:  Endurance: " "Tolerates 10 - 20 min exercise with multiple rests  Balance:  Static Sitting Balance  Static Sitting-Level of Assistance: Close supervision  Static Standing Balance  Static Standing-Level of Assistance: Maximum assistance (x2 with arm in arm assist)  Bed Mobility/Transfers: Bed Mobility/Transfers: Bed Mobility  Bed Mobility: Yes  Bed Mobility 1  Bed Mobility 1: Supine to sitting  Level of Assistance 1: Maximum assistance, Moderate verbal cues (x2)  Bed Mobility Comments 1: Required assist of each therapist managing one LE during task, HOB elevated, draw sheet and grab bar utilized.  Bed Mobility 2  Bed Mobility  2: Sitting to supine  Level of Assistance 2: Dependent (x2)  Bed Mobility Comments 2: Grab bars and draw sheet utilized.   and Transfers  Transfer: Yes  Transfer 1  Transfer From 1: Sit to, Stand to  Transfer to 1: Stand, Sit  Technique 1: Sit to stand, Stand to sit  Transfer Device 1:  (B/L arm in arm assist)  Transfer Level of Assistance 1: Maximum assistance (x2)  Trials/Comments 1: 2 trials performed with use of momentum, EOB elevated. Momentum and draw sheet utilized to assist with sit <->stand. Pt able to take a minimal swivel step with left LE and max assist x2 for wt. shifting, unable to advance RLE at this time.       Vision: Vision - Basic Assessment  Current Vision: Wears glasses all the time   and    Sensation:  Light Touch:  (NARCISO's grossly WFL; pt reports decreased sensation B/L LE's, refer to PT notes for details. Stated, \"I know my feet are on the floor, but I feel like I'm not touching the floor.)  Strength:  Strength Comments: Grossly 4+/5 throughout B/L UE's.  Perception:  Inattention/Neglect: Appears intact  Coordination:      Hand Function:  Hand Function  Gross Grasp: Functional  Extremities:   RUE   RUE :  (AROM Grossly WFL), LUE   LUE:  (AROM grossly WFL.), RLE   RLE :  (Significant RLE lymphedema noted, refer to PT notes for details.), and LLE   LLE :  (Left LE edema noted throughout " (although not as pronounced as right -- also with h/o lymphedema Left LE?))      Outcome Measures: [facility] Daily Activity  Putting on and taking off regular lower body clothing: Total  Bathing (including washing, rinsing, drying): A lot  Putting on and taking off regular upper body clothing: A little  Toileting, which includes using toilet, bedpan or urinal: A lot  Taking care of personal grooming such as brushing teeth: A little  Eating Meals: A little  Daily Activity - Total Score: 14         ,     OT Adult Other Outcome Measures  4AT: 0    Education Documentation  Precautions, taught by Jyoti Blevins OT at 3/26/2024  1:32 PM.  Learner: Patient  Readiness: Acceptance  Method: Explanation  Response: Needs Reinforcement    ADL Training, taught by Jyoti Blevins OT at 3/26/2024  1:32 PM.  Learner: Patient  Readiness: Acceptance  Method: Explanation  Response: Needs Reinforcement    Education Comments  No comments found.        Goals:   Encounter Problems       Encounter Problems (Active)       ADLs       Patient will perform UB and LB bathing  with minimal assist  level of assistance and extended tub bench and long-handled sponge. (Progressing)       Start:  03/26/24    Expected End:  04/09/24            Patient with complete lower body dressing with minimal assist  level of assistance donning and doffing pants without a zipper/fasteners, skirt, underwear, and socks with reacher, shoe horn, and sock-aid while edge of bed.  (Progressing)       Start:  03/26/24    Expected End:  04/09/24            Patient will complete toileting including hygiene clothing management/hygiene with minimal assist  level of assistance and raised toilet seat and toilet aide as appropriate. (Progressing)       Start:  03/26/24    Expected End:  04/09/24               MOBILITY       Patient will perform Functional mobility  Household distances/Community Distances with minimal assist  level of assistance and least restrictive device in order to  improve safety and functional mobility. (Progressing)       Start:  03/26/24    Expected End:  04/09/24               TRANSFERS       Patient will perform bed mobility minimal assist  level of assistance and bed rails and leg  as appropriate in order to improve safety and independence with mobility (Progressing)       Start:  03/26/24    Expected End:  04/09/24            Patient will complete functional transfer to all surfaces with least restrictive device with minimal assist  level of assistance. (Progressing)       Start:  03/26/24    Expected End:  04/09/24                   Treatment Completed on Evaluation    Therapy/Activity:     Therapeutic Activity  Therapeutic Activity Performed: Yes  Therapeutic Activity 1: Pt sat EOB with SBA approx. 10-12 minutes in between 2 standing trials with max assist x2 and B/L arm in arm assist. Pt educated to rehab process, role of OT, and rec. for mod intensity OT upon D/C. Also d/w pt likely rec' s for AE/DME once pt returns to home setting (to be clarified at rehab pending pt's progress). Pt also educated to role of case mgmt/SW re: D/C planning as well as being a resource for additional assistance for services in the home upon eventual D/C (pt requesting home assistance givem recent difficulties with ADL's/home tasks). Will continue to follow pt to progress mobility/ADL's with adaptive technique/AE in current setting.                 03/26/24 at 1:42 PM   Jyoti Blevins OT   Rehab Office: [phone]

## 2024-03-27 LAB
ALBUMIN SERPL BCP-MCNC: 2.4 G/DL (ref 3.4–5)
ANION GAP SERPL CALC-SCNC: 12 MMOL/L (ref 10–20)
BACTERIA SPEC CULT: ABNORMAL
BACTERIA SPEC CULT: ABNORMAL
BUN SERPL-MCNC: 22 MG/DL (ref 6–23)
CALCIUM SERPL-MCNC: 9.1 MG/DL (ref 8.6–10.6)
CHLORIDE SERPL-SCNC: 110 MMOL/L (ref 98–107)
CO2 SERPL-SCNC: 23 MMOL/L (ref 21–32)
CREAT SERPL-MCNC: 0.76 MG/DL (ref 0.5–1.05)
EGFRCR SERPLBLD CKD-EPI 2021: 83 ML/MIN/1.73M*2
ERYTHROCYTE [DISTWIDTH] IN BLOOD BY AUTOMATED COUNT: 15.3 % (ref 11.5–14.5)
GLUCOSE SERPL-MCNC: 95 MG/DL (ref 74–99)
GRAM STN SPEC: ABNORMAL
GRAM STN SPEC: ABNORMAL
HCT VFR BLD AUTO: 32.1 % (ref 36–46)
HGB BLD-MCNC: 9.8 G/DL (ref 12–16)
MAGNESIUM SERPL-MCNC: 1.5 MG/DL (ref 1.6–2.4)
MCH RBC QN AUTO: 24.8 PG (ref 26–34)
MCHC RBC AUTO-ENTMCNC: 30.5 G/DL (ref 32–36)
MCV RBC AUTO: 81 FL (ref 80–100)
NRBC BLD-RTO: 0 /100 WBCS (ref 0–0)
PHOSPHATE SERPL-MCNC: 3 MG/DL (ref 2.5–4.9)
PLATELET # BLD AUTO: 189 X10*3/UL (ref 150–450)
POTASSIUM SERPL-SCNC: 3.9 MMOL/L (ref 3.5–5.3)
RBC # BLD AUTO: 3.95 X10*6/UL (ref 4–5.2)
SODIUM SERPL-SCNC: 141 MMOL/L (ref 136–145)
WBC # BLD AUTO: 6 X10*3/UL (ref 4.4–11.3)

## 2024-03-27 PROCEDURE — 1100000001 HC PRIVATE ROOM DAILY

## 2024-03-27 PROCEDURE — 80069 RENAL FUNCTION PANEL: CPT | Performed by: INTERNAL MEDICINE

## 2024-03-27 PROCEDURE — 36415 COLL VENOUS BLD VENIPUNCTURE: CPT | Performed by: INTERNAL MEDICINE

## 2024-03-27 PROCEDURE — 83735 ASSAY OF MAGNESIUM: CPT | Performed by: INTERNAL MEDICINE

## 2024-03-27 PROCEDURE — 85027 COMPLETE CBC AUTOMATED: CPT | Performed by: INTERNAL MEDICINE

## 2024-03-27 PROCEDURE — 2500000004 HC RX 250 GENERAL PHARMACY W/ HCPCS (ALT 636 FOR OP/ED): Performed by: NURSE PRACTITIONER

## 2024-03-27 PROCEDURE — 99232 SBSQ HOSP IP/OBS MODERATE 35: CPT | Performed by: INTERNAL MEDICINE

## 2024-03-27 PROCEDURE — 2500000001 HC RX 250 WO HCPCS SELF ADMINISTERED DRUGS (ALT 637 FOR MEDICARE OP): Performed by: INTERNAL MEDICINE

## 2024-03-27 PROCEDURE — 2500000004 HC RX 250 GENERAL PHARMACY W/ HCPCS (ALT 636 FOR OP/ED): Performed by: INTERNAL MEDICINE

## 2024-03-27 PROCEDURE — 2500000001 HC RX 250 WO HCPCS SELF ADMINISTERED DRUGS (ALT 637 FOR MEDICARE OP)

## 2024-03-27 PROCEDURE — 2500000001 HC RX 250 WO HCPCS SELF ADMINISTERED DRUGS (ALT 637 FOR MEDICARE OP): Performed by: NURSE PRACTITIONER

## 2024-03-27 RX ORDER — FUROSEMIDE 10 MG/ML
40 INJECTION INTRAMUSCULAR; INTRAVENOUS ONCE
Status: COMPLETED | OUTPATIENT
Start: 2024-03-27 | End: 2024-03-27

## 2024-03-27 RX ADMIN — HEPARIN SODIUM 7500 UNITS: 5000 INJECTION INTRAVENOUS; SUBCUTANEOUS at 06:36

## 2024-03-27 RX ADMIN — HEPARIN SODIUM 7500 UNITS: 5000 INJECTION INTRAVENOUS; SUBCUTANEOUS at 22:04

## 2024-03-27 RX ADMIN — FUROSEMIDE 40 MG: 10 INJECTION, SOLUTION INTRAVENOUS at 12:23

## 2024-03-27 RX ADMIN — PRAZOSIN HYDROCHLORIDE 2 MG: 2 CAPSULE ORAL at 08:18

## 2024-03-27 RX ADMIN — LISINOPRIL 40 MG: 20 TABLET ORAL at 08:18

## 2024-03-27 RX ADMIN — PRAZOSIN HYDROCHLORIDE 2 MG: 2 CAPSULE ORAL at 22:04

## 2024-03-27 RX ADMIN — CEPHALEXIN 500 MG: 500 CAPSULE ORAL at 17:43

## 2024-03-27 RX ADMIN — NADOLOL 80 MG: 40 TABLET ORAL at 08:18

## 2024-03-27 RX ADMIN — NYSTATIN 1 APPLICATION: 100000 POWDER TOPICAL at 22:04

## 2024-03-27 RX ADMIN — PRAZOSIN HYDROCHLORIDE 2 MG: 2 CAPSULE ORAL at 00:44

## 2024-03-27 RX ADMIN — AMLODIPINE BESYLATE 10 MG: 10 TABLET ORAL at 08:18

## 2024-03-27 RX ADMIN — CEPHALEXIN 500 MG: 500 CAPSULE ORAL at 06:36

## 2024-03-27 RX ADMIN — CEPHALEXIN 500 MG: 500 CAPSULE ORAL at 12:23

## 2024-03-27 RX ADMIN — HEPARIN SODIUM 7500 UNITS: 5000 INJECTION INTRAVENOUS; SUBCUTANEOUS at 14:17

## 2024-03-27 RX ADMIN — CEPHALEXIN 500 MG: 500 CAPSULE ORAL at 00:45

## 2024-03-27 ASSESSMENT — COGNITIVE AND FUNCTIONAL STATUS - GENERAL
STANDING UP FROM CHAIR USING ARMS: TOTAL
MOVING TO AND FROM BED TO CHAIR: TOTAL
TURNING FROM BACK TO SIDE WHILE IN FLAT BAD: TOTAL
CLIMB 3 TO 5 STEPS WITH RAILING: TOTAL
DAILY ACTIVITIY SCORE: 14
DRESSING REGULAR LOWER BODY CLOTHING: TOTAL
MOBILITY SCORE: 7
PERSONAL GROOMING: A LITTLE
TOILETING: A LOT
EATING MEALS: A LITTLE
WALKING IN HOSPITAL ROOM: TOTAL
HELP NEEDED FOR BATHING: A LOT
MOVING FROM LYING ON BACK TO SITTING ON SIDE OF FLAT BED WITH BEDRAILS: A LOT
DRESSING REGULAR UPPER BODY CLOTHING: A LITTLE

## 2024-03-27 ASSESSMENT — PAIN SCALES - GENERAL
PAINLEVEL_OUTOF10: 0 - NO PAIN
PAINLEVEL_OUTOF10: 0 - NO PAIN

## 2024-03-27 NOTE — CARE PLAN
The patient's goals for the shift include      The clinical goals for the shift include promote prolong wound healling

## 2024-03-27 NOTE — PROGRESS NOTES
Met with patient at bedside to discuss facility choices. Patient states her daughter reviewed list last night and will be visiting with patient later today and will have choices. This TCC received email from daughter Grace with facility choices of 1. [facility] 2. [facility]. Referrals placed via careport. Will continue to monitor for discharge planning needs.   Update 1095: [facility] able to [facility] patient. Spoke with patient who was in agreement. Message left for sydni Edwards to update. Facility able to accept patient Friday 3/29. MD updated.   Rita BERMANN, RN  Transitional Care Coordinator  [phone]

## 2024-03-27 NOTE — PROGRESS NOTES
"May Pratt is a 73 y.o. female on day 2 of admission presenting with Ulcer of right foot with muscle involvement without evidence of necrosis (CMS/HCC).    Subjective   Patient was seen and examined at bedside.  Patient leg swelling has decreased and she has great urine output with lasix     Objective     Physical Exam  Constitutional:  lying in bed with no distress   HEENT: moist oral mucosa  Neck: No JVD  Lungs: Clear to auscultation bilaterally, no wheezing, no rhonchi   Cardiac: regular rate and rhythm, S1 and S2 present, no rubs,  no murmurs, no gallops   Abdomen: bowel sounds present, non tender, non distended  Ext: bilateral lymphedema L> R, with chronic skin changes and small ulcer in the heel with clear liquid draining   Last Recorded Vitals  Blood pressure 155/52, pulse 64, temperature 36.3 °C (97.3 °F), resp. rate 16, height 1.676 m (5' 6\"), weight 143 kg (315 lb), SpO2 97 %.  Intake/Output last 3 Shifts:  I/O last 3 completed shifts:  In: 1100 (7.7 mL/kg) [I.V.:1000 (7 mL/kg); IV Piggyback:100]  Out: 900 (6.3 mL/kg) [Urine:900 (0.2 mL/kg/hr)]  Weight: 142.9 kg     Relevant Results  Scheduled medications  amLODIPine, 10 mg, oral, Daily  cephalexin, 500 mg, oral, q6h VENTURA  heparin (porcine), 7,500 Units, subcutaneous, q8h VENTURA  lisinopril, 40 mg, oral, Daily  nadolol, 80 mg, oral, Daily  nystatin, 1 Application, Topical, BID  prazosin, 2 mg, oral, BID      Continuous medications     PRN medications  PRN medications: acetaminophen **OR** acetaminophen **OR** acetaminophen, melatonin, polyethylene glycol     Assessment/Plan   May Pratt is a 73 y.o. female with a past medical history of HTN, lymphedema, and MGUS who presented to the ED who presented to the ED for chronic lymphedema, right heel ulcer and inability to take care of himself     PLAN  1. Right heel ulcer/cellulitis with hc of chronic lymphedema       1. Wound care consult pending       2. continue keflex for 7 days     2.  RUBEN       " 1. Resolved     3. HTN      1. Continue lisinopril, amlodipine and nadolol      4. Physical deconditioning       1. PT rec SNF      5. Prophylaxis       1. Heparin 7500 mg subcutaneous q 8 hours      Disposition: Pending wound care recs otherwise medically clear for SNF       I spent 35 minutes in the professional and overall care of this patient.        Julianne Wiley, DO

## 2024-03-28 ENCOUNTER — TELEPHONE (OUTPATIENT)
Dept: PRIMARY CARE | Facility: CLINIC | Age: 74
End: 2024-03-28
Payer: COMMERCIAL

## 2024-03-28 LAB
ALBUMIN SERPL BCP-MCNC: 2.6 G/DL (ref 3.4–5)
ANION GAP SERPL CALC-SCNC: 13 MMOL/L (ref 10–20)
BUN SERPL-MCNC: 20 MG/DL (ref 6–23)
CALCIUM SERPL-MCNC: 9.2 MG/DL (ref 8.6–10.6)
CHLORIDE SERPL-SCNC: 111 MMOL/L (ref 98–107)
CO2 SERPL-SCNC: 23 MMOL/L (ref 21–32)
CREAT SERPL-MCNC: 0.65 MG/DL (ref 0.5–1.05)
EGFRCR SERPLBLD CKD-EPI 2021: >90 ML/MIN/1.73M*2
ERYTHROCYTE [DISTWIDTH] IN BLOOD BY AUTOMATED COUNT: 15.4 % (ref 11.5–14.5)
GLUCOSE SERPL-MCNC: 122 MG/DL (ref 74–99)
HCT VFR BLD AUTO: 33.3 % (ref 36–46)
HGB BLD-MCNC: 9.9 G/DL (ref 12–16)
MAGNESIUM SERPL-MCNC: 1.54 MG/DL (ref 1.6–2.4)
MCH RBC QN AUTO: 24.7 PG (ref 26–34)
MCHC RBC AUTO-ENTMCNC: 29.7 G/DL (ref 32–36)
MCV RBC AUTO: 83 FL (ref 80–100)
NRBC BLD-RTO: 0 /100 WBCS (ref 0–0)
PHOSPHATE SERPL-MCNC: 3.1 MG/DL (ref 2.5–4.9)
PLATELET # BLD AUTO: 192 X10*3/UL (ref 150–450)
POTASSIUM SERPL-SCNC: 3.9 MMOL/L (ref 3.5–5.3)
RBC # BLD AUTO: 4.01 X10*6/UL (ref 4–5.2)
SODIUM SERPL-SCNC: 143 MMOL/L (ref 136–145)
WBC # BLD AUTO: 6 X10*3/UL (ref 4.4–11.3)

## 2024-03-28 PROCEDURE — 2500000001 HC RX 250 WO HCPCS SELF ADMINISTERED DRUGS (ALT 637 FOR MEDICARE OP): Performed by: NURSE PRACTITIONER

## 2024-03-28 PROCEDURE — 2500000004 HC RX 250 GENERAL PHARMACY W/ HCPCS (ALT 636 FOR OP/ED): Performed by: INTERNAL MEDICINE

## 2024-03-28 PROCEDURE — 85027 COMPLETE CBC AUTOMATED: CPT | Performed by: INTERNAL MEDICINE

## 2024-03-28 PROCEDURE — 1100000001 HC PRIVATE ROOM DAILY

## 2024-03-28 PROCEDURE — 83735 ASSAY OF MAGNESIUM: CPT | Performed by: INTERNAL MEDICINE

## 2024-03-28 PROCEDURE — 80069 RENAL FUNCTION PANEL: CPT | Performed by: INTERNAL MEDICINE

## 2024-03-28 PROCEDURE — 2500000004 HC RX 250 GENERAL PHARMACY W/ HCPCS (ALT 636 FOR OP/ED): Performed by: NURSE PRACTITIONER

## 2024-03-28 PROCEDURE — 2500000001 HC RX 250 WO HCPCS SELF ADMINISTERED DRUGS (ALT 637 FOR MEDICARE OP): Performed by: INTERNAL MEDICINE

## 2024-03-28 PROCEDURE — 97530 THERAPEUTIC ACTIVITIES: CPT | Mod: GP

## 2024-03-28 PROCEDURE — 36415 COLL VENOUS BLD VENIPUNCTURE: CPT | Performed by: INTERNAL MEDICINE

## 2024-03-28 PROCEDURE — 99232 SBSQ HOSP IP/OBS MODERATE 35: CPT | Performed by: INTERNAL MEDICINE

## 2024-03-28 RX ORDER — FUROSEMIDE 10 MG/ML
40 INJECTION INTRAMUSCULAR; INTRAVENOUS ONCE
Status: COMPLETED | OUTPATIENT
Start: 2024-03-28 | End: 2024-03-28

## 2024-03-28 RX ORDER — MAGNESIUM SULFATE HEPTAHYDRATE 40 MG/ML
2 INJECTION, SOLUTION INTRAVENOUS ONCE
Status: COMPLETED | OUTPATIENT
Start: 2024-03-28 | End: 2024-03-28

## 2024-03-28 RX ADMIN — CEPHALEXIN 500 MG: 500 CAPSULE ORAL at 00:00

## 2024-03-28 RX ADMIN — LISINOPRIL 40 MG: 20 TABLET ORAL at 08:17

## 2024-03-28 RX ADMIN — FUROSEMIDE 40 MG: 10 INJECTION, SOLUTION INTRAVENOUS at 12:16

## 2024-03-28 RX ADMIN — CEPHALEXIN 500 MG: 500 CAPSULE ORAL at 17:39

## 2024-03-28 RX ADMIN — AMLODIPINE BESYLATE 10 MG: 10 TABLET ORAL at 08:17

## 2024-03-28 RX ADMIN — HEPARIN SODIUM 7500 UNITS: 5000 INJECTION INTRAVENOUS; SUBCUTANEOUS at 21:29

## 2024-03-28 RX ADMIN — HEPARIN SODIUM 7500 UNITS: 5000 INJECTION INTRAVENOUS; SUBCUTANEOUS at 14:20

## 2024-03-28 RX ADMIN — CEPHALEXIN 500 MG: 500 CAPSULE ORAL at 05:00

## 2024-03-28 RX ADMIN — HEPARIN SODIUM 7500 UNITS: 5000 INJECTION INTRAVENOUS; SUBCUTANEOUS at 05:00

## 2024-03-28 RX ADMIN — MAGNESIUM SULFATE HEPTAHYDRATE 2 G: 40 INJECTION, SOLUTION INTRAVENOUS at 12:17

## 2024-03-28 RX ADMIN — PRAZOSIN HYDROCHLORIDE 2 MG: 2 CAPSULE ORAL at 08:17

## 2024-03-28 RX ADMIN — NYSTATIN 1 APPLICATION: 100000 POWDER TOPICAL at 21:45

## 2024-03-28 RX ADMIN — NADOLOL 80 MG: 40 TABLET ORAL at 08:18

## 2024-03-28 RX ADMIN — PRAZOSIN HYDROCHLORIDE 2 MG: 2 CAPSULE ORAL at 21:44

## 2024-03-28 RX ADMIN — NYSTATIN 1 APPLICATION: 100000 POWDER TOPICAL at 08:18

## 2024-03-28 RX ADMIN — CEPHALEXIN 500 MG: 500 CAPSULE ORAL at 12:17

## 2024-03-28 ASSESSMENT — COGNITIVE AND FUNCTIONAL STATUS - GENERAL
TURNING FROM BACK TO SIDE WHILE IN FLAT BAD: A LOT
TURNING FROM BACK TO SIDE WHILE IN FLAT BAD: A LOT
MOBILITY SCORE: 11
CLIMB 3 TO 5 STEPS WITH RAILING: TOTAL
WALKING IN HOSPITAL ROOM: A LOT
STANDING UP FROM CHAIR USING ARMS: A LOT
CLIMB 3 TO 5 STEPS WITH RAILING: TOTAL
MOVING FROM LYING ON BACK TO SITTING ON SIDE OF FLAT BED WITH BEDRAILS: A LOT
MOVING FROM LYING ON BACK TO SITTING ON SIDE OF FLAT BED WITH BEDRAILS: A LOT
MOVING TO AND FROM BED TO CHAIR: A LOT
MOBILITY SCORE: 11
WALKING IN HOSPITAL ROOM: A LOT
STANDING UP FROM CHAIR USING ARMS: A LOT
MOVING TO AND FROM BED TO CHAIR: A LOT

## 2024-03-28 ASSESSMENT — PAIN SCALES - GENERAL
PAINLEVEL_OUTOF10: 0 - NO PAIN
PAINLEVEL_OUTOF10: 0 - NO PAIN
PAINLEVEL_OUTOF10: 3

## 2024-03-28 ASSESSMENT — PAIN - FUNCTIONAL ASSESSMENT: PAIN_FUNCTIONAL_ASSESSMENT: 0-10

## 2024-03-28 NOTE — TELEPHONE ENCOUNTER
Question for you, do you have any update on the request you put in for her a walker that she can walk with and sit on?  Are you able to write something up that confirms she needs a lift chair that can be in her living room or her in bedroom. It is an electric recliner/lifting chair. Per [facility] if you would write something up her insurance will reimburse her up to 80 percent of the cost of the chair.        Order was placed with [facility] and faxed on 2/27. Please call them and check the status of the order for her wheeled walker. [phone]. Rx written for lift chair.

## 2024-03-28 NOTE — NURSING NOTE
3/28/24 1500 IV Team asked to place PIV. Pt assessed with and without US. Pt with edema to bilateral forearms. Two attempts made to place PIV to right wrist but were unsuccessful. Another attempt was made by another VAST Team member and was also unsuccessful. Pt states she has had a PICC p[laced in IR. Bedside RN made aware.

## 2024-03-28 NOTE — PROGRESS NOTES
Physical Therapy    Physical Therapy Treatment    Patient Name: May Pratt  MRN: [id number]  Today's Date: 3/28/2024  Time Calculation  Start Time: 1001  Stop Time: 1041  Time Calculation (min): 40 min       Assessment/Plan   PT Assessment  PT Assessment Results: Decreased strength, Decreased range of motion, Decreased endurance, Impaired balance, Decreased mobility, Pain  Rehab Prognosis: Good  Barriers to Discharge: none  End of Session Communication: Bedside nurse  Assessment Comment: Pt remains appropriate for continued PT after discharge to restore function.  End of Session Patient Position: Bed, 3 rail up, Alarm off, not on at start of session     PT Plan  Treatment/Interventions: Bed mobility, Transfer training, Stair training, Gait training, Balance training, Neuromuscular re-education, Strengthening, Range of motion, Endurance training, Therapeutic exercise, Therapeutic activity, Home exercise program, Postural re-education, Positioning  PT Plan: Skilled PT  PT Frequency: 3 times per week  PT Discharge Recommendations: Moderate intensity level of continued care  PT - OK to Discharge: Yes    General Visit Information:   PT  Visit  PT Received On: 03/28/24  General  Family/Caregiver Present: No  Patient Position Received: Bed, 3 rail up, Alarm off, not on at start of session  Preferred Learning Style: verbal  General Comment: Pt resting in bed upon entry. Pleasant and cooperative. Willing to work with PT. Pt notes feeling somewhat anxious/scared about mobility. Responded well to positive reinforcement/encouragement. Pt motivated to get well and back home.    Subjective   Precautions:  Precautions  Medical Precautions: Fall precautions    Objective   Pain:  Pain Assessment  Pain Assessment: 0-10  Pain Score: 3  Pain Location: Leg (BLE due to swelling/edema)  Cognition:  Cognition  Overall Cognitive Status: Within Functional Limits  Orientation Level: Oriented X4  Attention: Within Functional  Limits  Insight: Within function limits  Impulsive: Within functional limits  Postural Control:  Postural Control  Postural Control: Within Functional Limits  Static Sitting Balance  Static Sitting-Balance Support: Bilateral upper extremity supported  Static Sitting-Level of Assistance: Close supervision  Static Sitting-Comment/Number of Minutes: approximately >20 minutes total including rest periods between standing trials.  Dynamic Sitting Balance  Dynamic Sitting-Balance Support: Feet supported  Dynamic Sitting-Comments: mod assist for scooting  Static Standing Balance  Static Standing-Balance Support: Bilateral upper extremity supported (on a wheeled walker)  Static Standing-Comment/Number of Minutes: Two trials for approximatel 2 minutes each.    Activity Tolerance:  Activity Tolerance  Endurance: Tolerates 30 min exercise with multiple rests  Treatments:    Therapeutic Activity  Therapeutic Activity Performed: Yes  Therapeutic Activity 1: bed mobility, transfers, static stance, sidestep/fwd steps    Bed Mobility  Bed Mobility: Yes  Bed Mobility 1  Bed Mobility 1: Supine to sitting  Level of Assistance 1: Moderate assistance  Bed Mobility 2  Bed Mobility  2: Sitting to supine  Level of Assistance 2: Maximum assistance    Ambulation/Gait Training  Ambulation/Gait Training Performed: Yes  Ambulation/Gait Training 1  Surface 1: Level tile  Device 1: Rolling walker  Assistance 1: Minimum assistance  Quality of Gait 1: Diminished heel strike, Wide base of support, Inconsistent stride length, Decreased step length, Shuffling gait, Forward flexed posture  Comments/Distance (ft) 1: Sidestepped 2-3ft to R x1, 3ft fwd/retro x 2  Transfers  Transfer: Yes  Transfer 1  Transfer From 1: Sit to, Stand to  Transfer to 1: Stand, Sit  Transfer Device 1: Walker  Transfer Level of Assistance 1: Moderate assistance    Outcome Measures:  [facility] Basic Mobility  Turning from your back to your side while in a flat bed without using  bedrails: A lot  Moving from lying on your back to sitting on the side of a flat bed without using bedrails: A lot  Moving to and from bed to chair (including a wheelchair): A lot  Standing up from a chair using your arms (e.g. wheelchair or bedside chair): A lot  To walk in hospital room: A lot  Climbing 3-5 steps with railing: Total  Basic Mobility - Total Score: 11    Education Documentation  Precautions, taught by Edward Coyle PT at 3/28/2024 10:59 AM.  Learner: Patient  Readiness: Acceptance  Method: Explanation  Response: Verbalizes Understanding    Mobility Training, taught by Edward Coyle PT at 3/28/2024 10:59 AM.  Learner: Patient  Readiness: Acceptance  Method: Explanation  Response: Verbalizes Understanding    Education Comments  No comments found.      Encounter Problems       Encounter Problems (Active)       Mobility       STG - Patient will ambulate >5 ft with mod A and LRD (Progressing)       Start:  03/26/24    Expected End:  03/28/24            Pt will be able to sit on EOB for ~25 minutes with SBA and no signs of fatigue or SOB  (Progressing)       Start:  03/26/24    Expected End:  03/28/24               PT Transfers       STG - Patient will perform bed mobility with mod A  (Progressing)       Start:  03/26/24    Expected End:  03/28/24            STG - Patient will transfer sit to and from stand with mod A and LRD (Progressing)       Start:  03/26/24    Expected End:  03/28/24

## 2024-03-28 NOTE — TELEPHONE ENCOUNTER
[facility] confirmed they received order on 3/19 - they are going to run it today and move it forward. They are going to call patient to discuss cost and schedule delivery.

## 2024-03-28 NOTE — CONSULTS
Wound Care Consult     Visit Date: 3/28/2024      Patient Name: May Pratt         MRN: [id number]           YOB: 1950     Reason for Consult: Bilateral heels, Right inner leg, Right ischium PI, pannus moisture         Wound History: 73 y.o. female on day 2 of admission presenting with Ulcer of right foot with muscle involvement without evidence of necrosis (CMS/HCC).    Wound Assessment:  Wound 03/25/24 Other (comment) Heel Left (Active)   Wound Image   03/28/24 1520   Site Assessment Clean;Intact;Painful 03/28/24 1520   Trudy-Wound Assessment Painful;Calloused 03/28/24 1520   Non-staged Wound Description Not applicable 03/28/24 1520   Drainage Description None 03/28/24 1520   Drainage Amount None 03/28/24 1520   Dressing Dry dressing;ABD;Kerlix/rolled gauze 03/28/24 1520   Dressing Changed New 03/28/24 1520   Dressing Status Clean;Dry 03/28/24 1520       Wound 03/25/24 Pressure Injury Heel Right (Active)   Wound Image   03/28/24 1505   Site Assessment Fragile;Maceration;Necrotic;Sloughing 03/28/24 1505   Trudy-Wound Assessment Fragile;Macerated;Moist ;White 03/28/24 1505   Non-staged Wound Description Full thickness 03/28/24 1505   Pressure Injury Stage U 03/28/24 1505   Shape oval 03/28/24 1505   State of Healing Non-healing;Slough;Eschar 03/28/24 1505   Margins Poorly defined 03/28/24 1505   Drainage Description Spence;Purulent 03/28/24 1505   Drainage Amount Moderate 03/28/24 1505   Dressing Dry dressing;ABD;Kerlix/rolled gauze 03/28/24 1505   Dressing Changed New 03/28/24 1505   Dressing Status Clean;Dry 03/28/24 1505       Wound 03/28/24 Moisture Associated Skin Damage Leg Distal;Right;Upper;Anterior (Active)   Wound Image   03/28/24 1526   Site Assessment Denuded;Pink;Red 03/28/24 1526   Trudy-Wound Assessment Moist ;Macerated 03/28/24 1526   Non-staged Wound Description Partial thickness 03/28/24 1526   Shape scattered total area 03/28/24 1526   Wound Length (cm) 10 cm 03/28/24 1526   Wound  Width (cm) 10 cm 03/28/24 1526   Wound Surface Area (cm^2) 100 cm^2 03/28/24 1526   Wound Depth (cm) 0.2 cm 03/28/24 1526   Wound Volume (cm^3) 20 cm^3 03/28/24 1526   State of Healing Non-healing 03/28/24 1526   Margins Poorly defined 03/28/24 1526   Drainage Description Yellow;Serosanguineous 03/28/24 1526   Drainage Amount Moderate 03/28/24 1526   Dressing Changed New 03/28/24 1526   Dressing Status Clean;Dry 03/28/24 1526       Wound 03/28/24 Pressure Injury Ischium Dorsal;Proximal;Right;Upper (Active)   Wound Image   03/28/24 1546   Site Assessment Red;Pink 03/28/24 1546   Trudy-Wound Assessment Purple;Moist ;Fragile;Friable 03/28/24 1546   Non-staged Wound Description Partial thickness 03/28/24 1546   Pressure Injury Stage DTPI 03/28/24 1546   Shape round 03/28/24 1546   Wound Length (cm) 5 cm 03/28/24 1546   Wound Width (cm) 5 cm 03/28/24 1546   Wound Surface Area (cm^2) 25 cm^2 03/28/24 1546   Wound Depth (cm) 0.2 cm 03/28/24 1546   Wound Volume (cm^3) 5 cm^3 03/28/24 1546   State of Healing Non-healing 03/28/24 1546   Margins Well-defined edges 03/28/24 1546   Drainage Description Serosanguineous 03/28/24 1546   Drainage Amount Small 03/28/24 1546   Dressing Changed New 03/28/24 1546   Dressing Status Clean;Dry 03/28/24 1546       Wound Team Summary Assessment: The wound care team came to bedside to assess the patient wounds.  The patient has an unstageable pressure injury on the right heel that was draining a moderate amount of purulent drainage and had macerated wound edges.  The wound was painted with betadine and covered with a 4x4 cover sponge, ABD pad and Kerlix gauze roll.  The patient's left heel was intact however it was very soft, pink and painful with palpation. Concern for a possible fluid collection on the left heel.  The left heel was dressed with a 4x4 cover sponge, ABD pad and Kerlix gauze roll.  The patient also has MASD on her right medial leg that was cleansed with normal saline and  dressed with triad cream.  The patient's pannus was cleansed with normal saline and pat dry with a 4x4 cover sponge. Interdry Ag was applied to the patient Abdominal fold and 2 inches of fabric was extended out of the wound to wick fluid out of the crease.  Finally, the patient has a evolving DTI on the right ischium that was cleansed with normal saline and triad cream was applied over the wound.       Please consult Podiatry service to assess the patient Right heel and left heel. Increased drainage of her right heel and possible fluid collection of the left heel.    Wound Team Plan:   Recommendations: Daily or PRN of all wounds    Right Heel wound: Cleanse the wound with normal saline an dgently pat dry with a 4x4 cover sponge  Paint the heel with betadine and (cover with Aquacel Ag   Cover with a 4x4 cover sponge and ABD pad  Wrap the foot with Kerlix gauze roll      Left heel: Cleanse the heel with normal saline and gently pat dry with a 4x4 cover sponge  Pad the heel with 4x4 cover sponge, ABD pad  Wrap with Kerlix gauze roll     Right medial lower leg MASD: Cleanse with normal saline   Triad can be applied directly from the tube or by using a gloved finger. Gently spread Triad evenly over the area of application to the thickness of a dime. To remove, Use pH-balanced wound cleanser to soften Triad. Gently wipe to remove without scrubbing. Triad can stay in place for 5-7 days    Pannus: Cleansed with normal saline and pat dry with a 4x4 cover sponge.   Interdry Ag was applied to the patient Abdominal fold and 2 inches of fabric was extended out of the wound to wick fluid out of the crease.     Right ischium press injury: Cleanse with normal saline   Triad can be applied directly from the tube or by using a gloved finger. Gently spread Triad evenly over the area of application to the thickness of a dime. To remove, Use pH-balanced wound cleanser to soften Triad. Gently wipe to remove without scrubbing. Triad can  stay in place for 5-7 days      César Lu RN-BC, CWON  3/28/2024  5:35 PM

## 2024-03-28 NOTE — CARE PLAN
The patient's goals for the shift include      The clinical goals for the shift include promote prolong wound healing     This test was ordered as requested by Sapna Lal NP with the [facility] transplant program. It was recommended that this be done under diagnosis codes P 38.0X5A as well as Z94.0.    These are the only codes I have available for why this test is being requested.

## 2024-03-28 NOTE — PROGRESS NOTES
03/28/24 1400   Discharge Planning   Home or Post Acute Services Post acute facilities (Rehab/SNF/etc)   Type of Post Acute Facility Services Skilled nursing   Patient expects to be discharged to: [facility]   Does the patient need discharge transport arranged? Yes   RoundTrip coordination needed? Yes   Has discharge transport been arranged? Yes   What day is the transport expected? 03/29/24   What time is the transport expected? 1300     Patient to discharge to [facility] Friday 3/29. Transport arranged via [facility] stretcher for 1pm. Report #: [phone]. Patient, MD, Shanita RN aware. Blue slip delivered to unit secretary. Patient states she will notify her family.   Rita FRY, RN  Transitional Care Coordinator (TCC)  [phone]

## 2024-03-28 NOTE — PROGRESS NOTES
"May Pratt is a 73 y.o. female on day 3 of admission presenting with Ulcer of right foot with muscle involvement without evidence of necrosis (CMS/HCC).    Subjective   Patient was seen and examined at bedside. Patient admitted her lower extremity lymphedema is improving. The heel ulcer drainage is decreasing and the color remains clear which is most likely from her lymphedema     Objective     Physical Exam  Constitutional:  lying in bed with no distress   HEENT: moist oral mucosa  Neck: No JVD  Lungs: Clear to auscultation bilaterally, no wheezing, no rhonchi   Cardiac: regular rate and rhythm, S1 and S2 present, no rubs,  no murmurs, no gallops   Abdomen: bowel sounds present, non tender, non distended  Ext: bilateral lymphedema L> R, with chronic skin changes/chronic stasis dermatitis and small ulcer in the heel with clear liquid draining decreasing drain. No fluid fluctuations felt during exam    Last Recorded Vitals  Blood pressure 155/56, pulse 67, temperature 36.4 °C (97.5 °F), temperature source Temporal, resp. rate 18, height 1.676 m (5' 6\"), weight 143 kg (315 lb), SpO2 97 %.  Intake/Output last 3 Shifts:  I/O last 3 completed shifts:  In: 50 (0.3 mL/kg) [I.V.:50 (0.3 mL/kg)]  Out: 3050 (21.3 mL/kg) [Urine:3050 (0.6 mL/kg/hr)]  Weight: 142.9 kg     Relevant Results  Scheduled medications  amLODIPine, 10 mg, oral, Daily  cephalexin, 500 mg, oral, q6h VENTURA  heparin (porcine), 7,500 Units, subcutaneous, q8h VENTURA  lisinopril, 40 mg, oral, Daily  nadolol, 80 mg, oral, Daily  nystatin, 1 Application, Topical, BID  prazosin, 2 mg, oral, BID      Continuous medications     PRN medications  PRN medications: acetaminophen **OR** acetaminophen **OR** acetaminophen, melatonin, polyethylene glycol     Assessment/Plan   May Pratt is a 73 y.o. female with a past medical history of HTN, lymphedema, and MGUS who presented to the ED who presented to the ED for chronic lymphedema, right heel ulcer and " inability to take care of himself     PLAN  1. Right heel ulcer/cellulitis with hx of chronic lymphedema       1. continue keflex for 7 days for cellulitis      2. The small superficial drain with skin callus is draining clear liquid which is decreasing in drain now. Her lymphedema is responding to lasix       3. Wound care orders in       4. X ray and ultrasound on admission and no deep abscess or concern for osteomyelitis      2.  RUBEN       1. Resolved     3. HTN      1. Continue lisinopril, amlodipine and nadolol      4. Physical deconditioning       1. PT rec SNF      5. Prophylaxis       1. Heparin 7500 mg subcutaneous q 8 hours      Disposition: Patient will be dc tomorrow to SNF and can follow up outpatient with wound care for lymphedema management      I spent 35 minutes in the professional and overall care of this patient.     Julianne Wiley, DO

## 2024-03-28 NOTE — PROGRESS NOTES
Music Therapy Note    May Pratt     Therapy Session  Referral Type: New referral this admission  Visit Type: New visit  Session Start Time: 1204  Session End Time: 1214  Intervention Delivery: In-person  Conflict of Service: None  Family Present for Session: None     Pre-assessment  Unable to Assess Reason: Outcomes not applicable  Mood/Affect: Appropriate, Calm, Cooperative         Treatment/Interventions  Music Therapy Interventions: Assessment  Interruption: No  Patient Fell Asleep at End of Session: No    Post-assessment  Unable to Assess Reason: Did not provide expressive therapy intervention  Mood/Affect: Appropriate, Calm, Cooperative  Continue Visiting: Yes  Total Session Time (min): 10 minutes    Narrative  Assessment Detail: Patient presented awake and alert, in bed with HOB raised, displaying calm affect. MT introduced self and role and pt was receptive to music therapy education. Patient shared that she feels she is improving physically and is grateful for the care she has received here in the hospital. Patient shared that she enjoys gospel music and expressed interest in the music therapy modality but declined a session at this time.  Follow-up: MT to continue to follow.    Education Documentation  No documentation found.

## 2024-03-28 NOTE — PROGRESS NOTES
Music Therapy Note    May Pratt     Therapy Session  Referral Type: New referral this admission  Visit Type: Follow-up visit  Session Start Time: 1144  Session End Time: 1144  Intervention Delivery: In-person  Conflict of Service: Declined treatment  Family Present for Session: None     Pre-assessment  Unable to Assess Reason: Outcomes not applicable  Mood/Affect: Appropriate, Calm, Cooperative         Treatment/Interventions  Music Therapy Interventions: Assessment  Interruption: No  Patient Fell Asleep at End of Session: No    Post-assessment  Unable to Assess Reason: Did not provide expressive therapy intervention  Mood/Affect: Appropriate, Calm, Cooperative  Continue Visiting: Yes  Total Session Time (min): 0 minutes    Narrative  Assessment Detail: Patient presented awake and alert, supine in bed, displaying calm affect. Patient was on a phone call. Patient declined music intervention at this time, expressing gratitude for MT's attempt.  Follow-up: MT to continue to follow.    Education Documentation  No documentation found.

## 2024-03-29 VITALS
HEART RATE: 68 BPM | DIASTOLIC BLOOD PRESSURE: 64 MMHG | TEMPERATURE: 97.9 F | OXYGEN SATURATION: 97 % | RESPIRATION RATE: 21 BRPM | HEIGHT: 66 IN | BODY MASS INDEX: 47.09 KG/M2 | WEIGHT: 293 LBS | SYSTOLIC BLOOD PRESSURE: 181 MMHG

## 2024-03-29 PROCEDURE — 99239 HOSP IP/OBS DSCHRG MGMT >30: CPT | Performed by: INTERNAL MEDICINE

## 2024-03-29 PROCEDURE — 2500000001 HC RX 250 WO HCPCS SELF ADMINISTERED DRUGS (ALT 637 FOR MEDICARE OP): Performed by: INTERNAL MEDICINE

## 2024-03-29 PROCEDURE — 2500000001 HC RX 250 WO HCPCS SELF ADMINISTERED DRUGS (ALT 637 FOR MEDICARE OP): Performed by: NURSE PRACTITIONER

## 2024-03-29 PROCEDURE — 2500000004 HC RX 250 GENERAL PHARMACY W/ HCPCS (ALT 636 FOR OP/ED): Performed by: NURSE PRACTITIONER

## 2024-03-29 RX ORDER — FUROSEMIDE 40 MG/1
40 TABLET ORAL 3 TIMES WEEKLY
Status: DISCONTINUED | OUTPATIENT
Start: 2024-03-29 | End: 2024-03-29 | Stop reason: HOSPADM

## 2024-03-29 RX ORDER — OXYCODONE HYDROCHLORIDE 5 MG/1
5 TABLET ORAL EVERY 6 HOURS PRN
Status: DISCONTINUED | OUTPATIENT
Start: 2024-03-29 | End: 2024-03-29 | Stop reason: HOSPADM

## 2024-03-29 RX ORDER — POLYETHYLENE GLYCOL 3350 17 G/17G
17 POWDER, FOR SOLUTION ORAL DAILY PRN
Start: 2024-03-29

## 2024-03-29 RX ORDER — ACETAMINOPHEN 500 MG
1000 TABLET ORAL EVERY 8 HOURS PRN
Qty: 30 TABLET | Refills: 0
Start: 2024-03-29

## 2024-03-29 RX ORDER — OXYCODONE HYDROCHLORIDE 5 MG/1
5 TABLET ORAL EVERY 6 HOURS PRN
Qty: 12 TABLET | Refills: 0 | Status: SHIPPED | OUTPATIENT
Start: 2024-03-29 | End: 2024-04-01

## 2024-03-29 RX ORDER — NYSTATIN 100000 [USP'U]/G
1 POWDER TOPICAL 2 TIMES DAILY
Qty: 1 G | Refills: 0
Start: 2024-03-29

## 2024-03-29 RX ORDER — FUROSEMIDE 40 MG/1
40 TABLET ORAL 3 TIMES WEEKLY
Start: 2024-03-29

## 2024-03-29 RX ORDER — OXYCODONE HYDROCHLORIDE 5 MG/1
5 TABLET ORAL EVERY 6 HOURS PRN
Qty: 15 TABLET | Refills: 0
Start: 2024-03-29 | End: 2024-03-29 | Stop reason: SDUPTHER

## 2024-03-29 RX ORDER — CEPHALEXIN 500 MG/1
500 CAPSULE ORAL EVERY 6 HOURS SCHEDULED
Start: 2024-03-29 | End: 2024-03-29 | Stop reason: SDUPTHER

## 2024-03-29 RX ORDER — CEPHALEXIN 500 MG/1
500 CAPSULE ORAL EVERY 6 HOURS SCHEDULED
Qty: 16 CAPSULE | Refills: 0 | Status: SHIPPED | OUTPATIENT
Start: 2024-03-29 | End: 2024-04-02

## 2024-03-29 RX ADMIN — FUROSEMIDE 40 MG: 40 TABLET ORAL at 08:36

## 2024-03-29 RX ADMIN — NYSTATIN 1 APPLICATION: 100000 POWDER TOPICAL at 08:37

## 2024-03-29 RX ADMIN — AMLODIPINE BESYLATE 10 MG: 10 TABLET ORAL at 08:37

## 2024-03-29 RX ADMIN — HEPARIN SODIUM 7500 UNITS: 5000 INJECTION INTRAVENOUS; SUBCUTANEOUS at 07:11

## 2024-03-29 RX ADMIN — CEPHALEXIN 500 MG: 500 CAPSULE ORAL at 00:33

## 2024-03-29 RX ADMIN — CEPHALEXIN 500 MG: 500 CAPSULE ORAL at 07:11

## 2024-03-29 RX ADMIN — PRAZOSIN HYDROCHLORIDE 2 MG: 2 CAPSULE ORAL at 08:36

## 2024-03-29 RX ADMIN — LISINOPRIL 40 MG: 20 TABLET ORAL at 08:37

## 2024-03-29 RX ADMIN — NADOLOL 80 MG: 40 TABLET ORAL at 10:36

## 2024-03-29 NOTE — DISCHARGE INSTRUCTIONS
Recommendations: Daily or PRN of all wounds     Right Heel wound: Cleanse the wound with normal saline an dgently pat dry with a 4x4 cover sponge  Paint the heel with betadine and (cover with Aquacel Ag   Cover with a 4x4 cover sponge and ABD pad  Wrap the foot with Kerlix gauze roll      Left heel: Cleanse the heel with normal saline and gently pat dry with a 4x4 cover sponge  Pad the heel with 4x4 cover sponge, ABD pad  Wrap with Kerlix gauze roll      Right medial lower leg MASD: Cleanse with normal saline   Triad can be applied directly from the tube or by using a gloved finger. Gently spread Triad evenly over the area of application to the thickness of a dime. To remove, Use pH-balanced wound cleanser to soften Triad. Gently wipe to remove without scrubbing. Triad can stay in place for 5-7 days     Pannus: Cleansed with normal saline and pat dry with a 4x4 cover sponge.   Interdry Ag was applied to the patient Abdominal fold and 2 inches of fabric was extended out of the wound to wick fluid out of the crease.      Right ischium press injury: Cleanse with normal saline   Triad can be applied directly from the tube or by using a gloved finger. Gently spread Triad evenly over the area of application to the thickness of a dime. To remove, Use pH-balanced wound cleanser to soften Triad. Gently wipe to remove without scrubbing. Triad can stay in place for 5-7 days

## 2024-03-29 NOTE — PROGRESS NOTES
DISCHARGE MEDICATION REVIEW BY PHARMACY     NAME:  May Pratt   MRN:  [id number]   SERVICE DATE: 3/29/2024   SERVICE TIME:  8:34 AM       The following discharge medications were reviewed by a pharmacist: Yes  The following recommendations were made: N/A  Dispo: [facility]       Medication List      START taking these medications     acetaminophen 500 mg tablet; Commonly known as: Tylenol; Take 2 tablets   (1,000 mg) by mouth every 8 hours if needed (for pain).   cephalexin 500 mg capsule; Commonly known as: Keflex; Take 1 capsule   (500 mg) by mouth every 6 hours for 3 days.   furosemide 40 mg tablet; Commonly known as: Lasix; Take 1 tablet (40 mg)   by mouth 3 times a week. To be taken Afkorn-Xilkiocdi-Crdzsz   nystatin 100,000 unit/gram powder; Commonly known as: Mycostatin; Apply   1 Application topically 2 times a day.   polyethylene glycol 17 gram packet; Commonly known as: Glycolax,   Miralax; Take 17 g by mouth once daily as needed (constipation).     CONTINUE taking these medications     albuterol 90 mcg/actuation inhaler; Inhale 1 puff every 6 hours if   needed for wheezing or shortness of breath.   amLODIPine 10 mg tablet; Commonly known as: Norvasc; TAKE 1 TABLET BY   MOUTH DAILY   lisinopril 40 mg tablet; TAKE 1 TABLET BY MOUTH DAILY   nadolol 80 mg tablet; Commonly known as: Corgard   prazosin 2 mg capsule; Commonly known as: Minipress     STOP taking these medications     gentamicin 0.1 % ointment; Commonly known as: Garamycin   sulfamethoxazole-trimethoprim 800-160 mg tablet; Commonly known as:   Bactrim DS       Janet Manning, PharmD, [facility]  Bekah Rollins 3 Pharmacist

## 2024-03-30 LAB
BACTERIA BLD CULT: NORMAL
BACTERIA BLD CULT: NORMAL

## 2024-04-01 NOTE — PROGRESS NOTES
Music Therapy Note    May Pratt     Therapy Session  Referral Type: New referral this admission  Visit Type: Follow-up visit  Session Start Time: 1117  Session End Time: 1121  Intervention Delivery: In-person  Conflict of Service: None  Family Present for Session: None     Pre-assessment  Unable to Assess Reason: Outcomes not applicable  Mood/Affect: Appropriate, Calm, Cooperative         Treatment/Interventions  Music Therapy Interventions: Termination  Interruption: No  Patient Fell Asleep at End of Session: No    Post-assessment  Unable to Assess Reason: Did not provide expressive therapy intervention  Mood/Affect: Appropriate, Calm, Cooperative  Continue Visiting: No  Total Session Time (min): 4 minutes    Narrative  Assessment Detail: Patient presented awake and alert, in bed with HOB raised, displaying bright affect. Patient shared that she would be going to SNF this day and expressed gratitude for MT's visits during her admission.  Plan: MT provided pt with resources for termination of inpatient services at this time and preparation for discharge.  Follow-up: Follow-up not indicated at this time, as pt is discharging.    Education Documentation  No documentation found.

## 2024-04-22 NOTE — DISCHARGE SUMMARY
Discharge Diagnosis  Right heel ulcer/cellulitis with hx of chronic lymphedema   RUBEN  HTN  Physical deconditioning     Hospital Course  May Pratt is a 73 y.o. female with a past medical history of HTN, lymphedema, and MGUS who presented to the ED who presented to the ED for chronic lymphedema, right heel ulcer and inability to take care of himself.  X ray and Ultrasound of the extremity did not show evidence of abscess or concern for osteomyelitis.  She was transition to keflex.  She was given IV lasix with great response to her lymphedema. PT recs SNF. She will follow up wound care outpatient and vascular for chronic lymphedema.  Stable for discharge     Pertinent Physical Exam At Time of Discharge  Physical Exam  Constitutional:  lying in bed with no distress   HEENT: moist oral mucosa  Neck: No JVD  Lungs: Clear to auscultation bilaterally, no wheezing, no rhonchi   Cardiac: regular rate and rhythm, S1 and S2 present, no rubs,  no murmurs, no gallops   Abdomen: bowel sounds present, non tender, non distended  Ext: bilateral lymphedema L> R, with chronic skin changes/chronic stasis dermatitis and small ulcer in the heel with clear liquid draining decreasing drain. No fluid fluctuations felt during exam  Home Medications     Medication List      START taking these medications     acetaminophen 500 mg tablet; Commonly known as: Tylenol; Take 2 tablets   (1,000 mg) by mouth every 8 hours if needed (for pain).   furosemide 40 mg tablet; Commonly known as: Lasix; Take 1 tablet (40 mg)   by mouth 3 times a week. To be taken Ksxare-Puiryvzly-Ingxpp   nystatin 100,000 unit/gram powder; Commonly known as: Mycostatin; Apply   1 Application topically 2 times a day.   polyethylene glycol 17 gram packet; Commonly known as: Glycolax,   Miralax; Take 17 g by mouth once daily as needed (constipation).     CONTINUE taking these medications     albuterol 90 mcg/actuation inhaler; Inhale 1 puff every 6 hours if   needed for  wheezing or shortness of breath.   amLODIPine 10 mg tablet; Commonly known as: Norvasc; TAKE 1 TABLET BY   MOUTH DAILY   lisinopril 40 mg tablet; TAKE 1 TABLET BY MOUTH DAILY   nadolol 80 mg tablet; Commonly known as: Corgard   prazosin 2 mg capsule; Commonly known as: Minipress     STOP taking these medications     gentamicin 0.1 % ointment; Commonly known as: Garamycin   sulfamethoxazole-trimethoprim 800-160 mg tablet; Commonly known as:   Bactrim DS     ASK your doctor about these medications     cephalexin 500 mg capsule; Commonly known as: Keflex; Take 1 capsule   (500 mg) by mouth every 6 hours for 4 days.; Ask about: Should I take this   medication?   oxyCODONE 5 mg immediate release tablet; Commonly known as: Roxicodone;   Take 1 tablet (5 mg) by mouth every 6 hours if needed for severe pain (7 -   10) for up to 3 days.; Ask about: Should I take this medication?       Outpatient Follow-Up  Future Appointments   Date Time Provider Department [facility]   6/17/2024  8:00 AM Katie Melissa MD [id number]   7/9/2024  3:20 PM Naomy Alberts MD [id number]     >35 minutes spent coordinating the care of the patient     Julianne Wiley DO

## 2024-04-30 ENCOUNTER — TELEPHONE (OUTPATIENT)
Dept: PRIMARY CARE | Facility: CLINIC | Age: 74
End: 2024-04-30
Payer: COMMERCIAL

## 2024-05-01 NOTE — TELEPHONE ENCOUNTER
I believe patient was left message in regards to needing to reschedule appointment from 6/17 that was cancelled.

## 2024-05-10 ENCOUNTER — APPOINTMENT (OUTPATIENT)
Dept: RADIOLOGY | Facility: HOSPITAL | Age: 74
End: 2024-05-10
Payer: MEDICARE

## 2024-05-10 ENCOUNTER — HOSPITAL ENCOUNTER (EMERGENCY)
Facility: HOSPITAL | Age: 74
Discharge: HOME | End: 2024-05-11
Attending: EMERGENCY MEDICINE
Payer: MEDICARE

## 2024-05-10 ENCOUNTER — APPOINTMENT (OUTPATIENT)
Dept: CARDIOLOGY | Facility: HOSPITAL | Age: 74
End: 2024-05-10
Payer: MEDICARE

## 2024-05-10 DIAGNOSIS — R06.02 SHORTNESS OF BREATH: Primary | ICD-10-CM

## 2024-05-10 DIAGNOSIS — I50.30 DIASTOLIC CONGESTIVE HEART FAILURE, UNSPECIFIED HF CHRONICITY (MULTI): ICD-10-CM

## 2024-05-10 LAB
ALBUMIN SERPL BCP-MCNC: 3.6 G/DL (ref 3.4–5)
ALP SERPL-CCNC: 66 U/L (ref 33–136)
ALT SERPL W P-5'-P-CCNC: 10 U/L (ref 7–45)
ANION GAP BLDV CALCULATED.4IONS-SCNC: 3 MMOL/L (ref 10–25)
ANION GAP SERPL CALC-SCNC: 11 MMOL/L (ref 10–20)
APPEARANCE UR: CLEAR
AST SERPL W P-5'-P-CCNC: 14 U/L (ref 9–39)
ATRIAL RATE: 62 BPM
BASE EXCESS BLDV CALC-SCNC: 7.3 MMOL/L (ref -2–3)
BASOPHILS # BLD AUTO: 0.04 X10*3/UL (ref 0–0.1)
BASOPHILS NFR BLD AUTO: 0.8 %
BILIRUB DIRECT SERPL-MCNC: 0.1 MG/DL (ref 0–0.3)
BILIRUB SERPL-MCNC: 0.7 MG/DL (ref 0–1.2)
BILIRUB UR STRIP.AUTO-MCNC: NEGATIVE MG/DL
BNP SERPL-MCNC: 157 PG/ML (ref 0–99)
BODY TEMPERATURE: 37 DEGREES CELSIUS
BUN SERPL-MCNC: 10 MG/DL (ref 6–23)
CA-I BLDV-SCNC: 1.24 MMOL/L (ref 1.1–1.33)
CALCIUM SERPL-MCNC: 8.8 MG/DL (ref 8.6–10.3)
CARDIAC TROPONIN I PNL SERPL HS: 3 NG/L (ref 0–13)
CARDIAC TROPONIN I PNL SERPL HS: 4 NG/L (ref 0–13)
CHLORIDE BLDV-SCNC: 106 MMOL/L (ref 98–107)
CHLORIDE SERPL-SCNC: 105 MMOL/L (ref 98–107)
CO2 SERPL-SCNC: 28 MMOL/L (ref 21–32)
COLOR UR: NORMAL
CREAT SERPL-MCNC: 0.46 MG/DL (ref 0.5–1.05)
CRP SERPL-MCNC: 0.83 MG/DL
EGFRCR SERPLBLD CKD-EPI 2021: >90 ML/MIN/1.73M*2
EOSINOPHIL # BLD AUTO: 0.12 X10*3/UL (ref 0–0.4)
EOSINOPHIL NFR BLD AUTO: 2.3 %
ERYTHROCYTE [DISTWIDTH] IN BLOOD BY AUTOMATED COUNT: 18 % (ref 11.5–14.5)
ERYTHROCYTE [SEDIMENTATION RATE] IN BLOOD BY WESTERGREN METHOD: 35 MM/H (ref 0–30)
GLUCOSE BLDV-MCNC: 103 MG/DL (ref 74–99)
GLUCOSE SERPL-MCNC: 98 MG/DL (ref 74–99)
GLUCOSE UR STRIP.AUTO-MCNC: NORMAL MG/DL
HCO3 BLDV-SCNC: 33.2 MMOL/L (ref 22–26)
HCT VFR BLD AUTO: 33.9 % (ref 36–46)
HCT VFR BLD EST: 44 % (ref 36–46)
HGB BLD-MCNC: 10.4 G/DL (ref 12–16)
HGB BLDV-MCNC: 14.8 G/DL (ref 12–16)
IMM GRANULOCYTES # BLD AUTO: 0.04 X10*3/UL (ref 0–0.5)
IMM GRANULOCYTES NFR BLD AUTO: 0.8 % (ref 0–0.9)
INHALED O2 CONCENTRATION: 21 %
KETONES UR STRIP.AUTO-MCNC: NEGATIVE MG/DL
LACTATE BLDV-SCNC: 1 MMOL/L (ref 0.4–2)
LEUKOCYTE ESTERASE UR QL STRIP.AUTO: NEGATIVE
LYMPHOCYTES # BLD AUTO: 1.08 X10*3/UL (ref 0.8–3)
LYMPHOCYTES NFR BLD AUTO: 20.5 %
MAGNESIUM SERPL-MCNC: 1.6 MG/DL (ref 1.6–2.4)
MCH RBC QN AUTO: 25.2 PG (ref 26–34)
MCHC RBC AUTO-ENTMCNC: 30.7 G/DL (ref 32–36)
MCV RBC AUTO: 82 FL (ref 80–100)
MONOCYTES # BLD AUTO: 0.3 X10*3/UL (ref 0.05–0.8)
MONOCYTES NFR BLD AUTO: 5.7 %
NEUTROPHILS # BLD AUTO: 3.7 X10*3/UL (ref 1.6–5.5)
NEUTROPHILS NFR BLD AUTO: 69.9 %
NITRITE UR QL STRIP.AUTO: NEGATIVE
NRBC BLD-RTO: 0 /100 WBCS (ref 0–0)
OXYHGB MFR BLDV: 64.7 % (ref 45–75)
P AXIS: 50 DEGREES
P OFFSET: 171 MS
P ONSET: 116 MS
PCO2 BLDV: 50 MM HG (ref 41–51)
PH BLDV: 7.43 PH (ref 7.33–7.43)
PH UR STRIP.AUTO: 6 [PH]
PLATELET # BLD AUTO: 204 X10*3/UL (ref 150–450)
PO2 BLDV: 41 MM HG (ref 35–45)
POTASSIUM BLDV-SCNC: 3.4 MMOL/L (ref 3.5–5.3)
POTASSIUM SERPL-SCNC: 3.3 MMOL/L (ref 3.5–5.3)
PR INTERVAL: 210 MS
PROT SERPL-MCNC: 7 G/DL (ref 6.4–8.2)
PROT UR STRIP.AUTO-MCNC: NEGATIVE MG/DL
Q ONSET: 221 MS
QRS COUNT: 10 BEATS
QRS DURATION: 76 MS
QT INTERVAL: 406 MS
QTC CALCULATION(BAZETT): 412 MS
QTC FREDERICIA: 410 MS
R AXIS: 28 DEGREES
RBC # BLD AUTO: 4.12 X10*6/UL (ref 4–5.2)
RBC # UR STRIP.AUTO: NEGATIVE /UL
SAO2 % BLDV: 67 % (ref 45–75)
SODIUM BLDV-SCNC: 139 MMOL/L (ref 136–145)
SODIUM SERPL-SCNC: 141 MMOL/L (ref 136–145)
SP GR UR STRIP.AUTO: 1.02
T AXIS: 49 DEGREES
T OFFSET: 424 MS
UROBILINOGEN UR STRIP.AUTO-MCNC: NORMAL MG/DL
VENTRICULAR RATE: 62 BPM
WBC # BLD AUTO: 5.3 X10*3/UL (ref 4.4–11.3)

## 2024-05-10 PROCEDURE — 2500000004 HC RX 250 GENERAL PHARMACY W/ HCPCS (ALT 636 FOR OP/ED): Performed by: EMERGENCY MEDICINE

## 2024-05-10 PROCEDURE — 83880 ASSAY OF NATRIURETIC PEPTIDE: CPT | Performed by: EMERGENCY MEDICINE

## 2024-05-10 PROCEDURE — 85652 RBC SED RATE AUTOMATED: CPT | Performed by: EMERGENCY MEDICINE

## 2024-05-10 PROCEDURE — 83735 ASSAY OF MAGNESIUM: CPT | Performed by: EMERGENCY MEDICINE

## 2024-05-10 PROCEDURE — 36415 COLL VENOUS BLD VENIPUNCTURE: CPT | Performed by: EMERGENCY MEDICINE

## 2024-05-10 PROCEDURE — 82435 ASSAY OF BLOOD CHLORIDE: CPT | Mod: 91 | Performed by: EMERGENCY MEDICINE

## 2024-05-10 PROCEDURE — 96374 THER/PROPH/DIAG INJ IV PUSH: CPT | Mod: 59

## 2024-05-10 PROCEDURE — 71275 CT ANGIOGRAPHY CHEST: CPT | Performed by: RADIOLOGY

## 2024-05-10 PROCEDURE — 71275 CT ANGIOGRAPHY CHEST: CPT

## 2024-05-10 PROCEDURE — 86140 C-REACTIVE PROTEIN: CPT | Performed by: EMERGENCY MEDICINE

## 2024-05-10 PROCEDURE — 84295 ASSAY OF SERUM SODIUM: CPT | Performed by: EMERGENCY MEDICINE

## 2024-05-10 PROCEDURE — 93005 ELECTROCARDIOGRAM TRACING: CPT

## 2024-05-10 PROCEDURE — 71045 X-RAY EXAM CHEST 1 VIEW: CPT | Performed by: RADIOLOGY

## 2024-05-10 PROCEDURE — 84132 ASSAY OF SERUM POTASSIUM: CPT | Mod: 59 | Performed by: EMERGENCY MEDICINE

## 2024-05-10 PROCEDURE — 71045 X-RAY EXAM CHEST 1 VIEW: CPT

## 2024-05-10 PROCEDURE — 82248 BILIRUBIN DIRECT: CPT | Performed by: EMERGENCY MEDICINE

## 2024-05-10 PROCEDURE — 85025 COMPLETE CBC W/AUTO DIFF WBC: CPT | Performed by: EMERGENCY MEDICINE

## 2024-05-10 PROCEDURE — 84484 ASSAY OF TROPONIN QUANT: CPT | Performed by: EMERGENCY MEDICINE

## 2024-05-10 PROCEDURE — 81003 URINALYSIS AUTO W/O SCOPE: CPT | Performed by: EMERGENCY MEDICINE

## 2024-05-10 PROCEDURE — 2550000001 HC RX 255 CONTRASTS: Performed by: EMERGENCY MEDICINE

## 2024-05-10 PROCEDURE — 99285 EMERGENCY DEPT VISIT HI MDM: CPT | Mod: 25

## 2024-05-10 RX ORDER — FUROSEMIDE 10 MG/ML
80 INJECTION INTRAMUSCULAR; INTRAVENOUS ONCE
Status: COMPLETED | OUTPATIENT
Start: 2024-05-10 | End: 2024-05-10

## 2024-05-10 RX ORDER — FUROSEMIDE 40 MG/1
40 TABLET ORAL 2 TIMES DAILY
Qty: 10 TABLET | Refills: 0 | Status: SHIPPED | OUTPATIENT
Start: 2024-05-10 | End: 2024-05-15

## 2024-05-10 RX ADMIN — IOHEXOL 75 ML: 350 INJECTION, SOLUTION INTRAVENOUS at 16:04

## 2024-05-10 RX ADMIN — FUROSEMIDE 80 MG: 10 INJECTION, SOLUTION INTRAMUSCULAR; INTRAVENOUS at 21:34

## 2024-05-10 ASSESSMENT — COLUMBIA-SUICIDE SEVERITY RATING SCALE - C-SSRS
1. IN THE PAST MONTH, HAVE YOU WISHED YOU WERE DEAD OR WISHED YOU COULD GO TO SLEEP AND NOT WAKE UP?: NO
2. HAVE YOU ACTUALLY HAD ANY THOUGHTS OF KILLING YOURSELF?: NO
6. HAVE YOU EVER DONE ANYTHING, STARTED TO DO ANYTHING, OR PREPARED TO DO ANYTHING TO END YOUR LIFE?: NO

## 2024-05-10 ASSESSMENT — PAIN - FUNCTIONAL ASSESSMENT: PAIN_FUNCTIONAL_ASSESSMENT: 0-10

## 2024-05-10 ASSESSMENT — PAIN SCALES - GENERAL: PAINLEVEL_OUTOF10: 0 - NO PAIN

## 2024-05-10 NOTE — ED TRIAGE NOTES
Pt arrives to ED with EMS for BLE and SOB. Pt is on vanco of osteomyelitis. Pt had vanco trough level and it was 24.7 per rehab facility. Pt is A&Ox4.

## 2024-05-11 VITALS
OXYGEN SATURATION: 94 % | TEMPERATURE: 97.6 F | WEIGHT: 293 LBS | HEART RATE: 58 BPM | SYSTOLIC BLOOD PRESSURE: 165 MMHG | BODY MASS INDEX: 44.41 KG/M2 | HEIGHT: 68 IN | DIASTOLIC BLOOD PRESSURE: 51 MMHG | RESPIRATION RATE: 15 BRPM

## 2024-06-09 NOTE — ED PROVIDER NOTES
HPI   Chief Complaint   Patient presents with    Leg Swelling    Shortness of Breath       HPI: []  73-year-old female history of hypertension, diabetes, lymphedema comes in with the increasing shortness of breath and leg swelling.  She thought she had an allergic reaction to vancomycin.  Patient denies any chest pain pressure heaviness fever chills nausea vomit diarrhea cough congestion incontinence seizures syncope or near syncope, does complain of increasing the swelling patient is on Lasix but 3 times a week Monday Wednesday Friday    Past history: Hypertension, diabetes, chronic lymphedema  Social: Patient denies current tobacco alcohol drug abuse.  REVIEW OF SYSTEMS:    GENERAL.: No weight loss, fatigue, anorexia, insomnia, fever.    EYES: No vision loss, double vision, drainage, eye pain.    ENT: No pharyngitis, dry mouth.    CARDIOPULMONARY: No chest pain, palpitations, syncope, near syncope.  Positive for shortness of breath, cough, hemoptysis.    GI: No abdominal pain, change in bowel habits, melena, hematemesis, hematochezia, nausea, vomiting, diarrhea.    : No discharge, dysuria, frequency, urgency, hematuria.    MS: No limb pain, joint pain, joint swelling.  Positive for leg swelling    SKIN: No rashes.    PSYCH: No depression, anxiety, suicidality, homicidality.    Review of systems is otherwise negative unless stated above or in history of present illness.  Social history, family history, allergies reviewed.  PHYSICAL EXAM:    GENERAL: Vitals noted, no distress. Alert and oriented  x 3. Non-toxic.      EENT: TMs clear. Posterior oropharynx unremarkable. No meningismus. No LAD.     NECK: Supple. Nontender. No midline tenderness.     CARDIAC: Regular, rate, rhythm. No murmurs rubs or gallops. No JVD    PULMONARY: Lungs clear bilaterally with good aeration. No wheezes rales or rhonchi. No respiratory distress.  Fine bibasilar crackles    ABDOMEN: Soft, nonsurgical. Nontender. No peritoneal signs.  Normoactive bowel sounds. No pulsatile masses.     EXTREMITIES: Chronic appearing lymphedema lower extremities bilaterally. Negative Homans bilaterally, no cords.  Pulses palpable bilaterally    SKIN: No rash. Intact.     NEURO: No focal neurologic deficits, NIH score of 0. Cranial nerves normal as tested from II through XII.     MEDICAL DECISION MAKING:  EKG on my interpretation shows a normal sinus rhythm normal axis rate mid 80s with no acute ischemic changes.  CBC with differential chemistries LFTs unremarkable troponin x 2 is negative BNP slightly elevated chest and CT angio chest showed bilateral pleural effusions.    Treatment in ED: IV established given IV Lasix 80 mg  ED course: Repeat assessment feeling remarkably better had adequate diuresis remains normotensive no tachycardia or hypoxia    Impression: CHF mostly diastolic  Plans and MDM: 71-year-old female history of: Lymphedema presents with increasing swelling of the lower extremities and shortness of breath workup is concerning for decompensated CHF most likely diastolic, low concern for pulm embolism pneumonia or allergic reaction, patient had good response to Lasix will be discharged home with Lasix daily for 5 days 40 twice daily then go back to her regular regimen.  Outpatient follow recommended.                          No data recorded                   Patient History   Past Medical History:   Diagnosis Date    Hypertension     Lymphedema      Past Surgical History:   Procedure Laterality Date    US GUIDED THYROID BIOPSY  8/22/2023    US GUIDED THYROID BIOPSY 8/22/2023 [facility]    US GUIDED THYROID BIOPSY  8/22/2023    US GUIDED THYROID BIOPSY 8/22/2023 [facility]     No family history on file.  Social History     Tobacco Use    Smoking status: Never    Smokeless tobacco: Never   Substance Use Topics    Alcohol use: Never    Drug use: Never       Physical Exam   ED Triage Vitals   Temperature Heart Rate Respirations BP   05/10/24 1229 05/10/24 1229  05/10/24 1229 05/10/24 1229   36.4 °C (97.6 °F) 62 18 (!) 183/73      Pulse Ox Temp Source Heart Rate Source Patient Position   05/10/24 1229 05/10/24 1229 05/10/24 1229 05/10/24 1330   99 % Oral Monitor Lying      BP Location FiO2 (%)     05/10/24 1330 --     Right arm        Physical Exam    ED Course & [facility]   ED Course as of 06/09/24 0947   Fri May 10, 2024   2155 Patient EKG on my interpretation shows normal sinus rhythm normal axis rate mid 70s with no ischemic changes.  CBC with differential chemistry LFTs are normal venous gas shows no hypercapnia  troponin x 2 is negative chest x-ray negative CT angio of the chest shows no PE does show small bilateral effusions which are new patient was given IV Lasix 80 mg feels better remains normotensive no hypoxia or tachycardia patient most likely has mild volume overload possibly diastolic heart failure I do not think is an allergic reaction vancomycin, patient be discharged back to nursing home with Lasix 40 milligrams twice daily for 5 days and then go back to her regimen of Monday Wednesday and Friday close outpatient follow-up with strict and precaution. [MT]      ED Course User Index  [MT] Shira Wakefield MD         Diagnoses as of 06/09/24 0947   Shortness of breath   Diastolic congestive heart failure, unspecified HF chronicity (Multi)       Medical Decision Making      Procedure  Procedures     Shira Wakefield MD  06/09/24 0959

## 2024-06-17 ENCOUNTER — APPOINTMENT (OUTPATIENT)
Dept: PRIMARY CARE | Facility: CLINIC | Age: 74
End: 2024-06-17
Payer: MEDICARE

## 2024-07-01 DIAGNOSIS — Z76.0 MEDICATION REFILL: ICD-10-CM

## 2024-07-01 RX ORDER — NADOLOL 80 MG/1
80 TABLET ORAL DAILY
Qty: 90 TABLET | Refills: 3 | Status: SHIPPED | OUTPATIENT
Start: 2024-07-01

## 2024-07-01 NOTE — TELEPHONE ENCOUNTER
Jeremiah [facility] letting Dr. Melissa know that the pt is discharging from [facility] and [facility] and wanting to confirm that Dr. Melissa will be following also requesting an updated fax number for Dr. Melissa. Reymundo can be reached at [phone]

## 2024-07-03 ENCOUNTER — TELEPHONE (OUTPATIENT)
Dept: PRIMARY CARE | Facility: CLINIC | Age: 74
End: 2024-07-03
Payer: COMMERCIAL

## 2024-07-03 NOTE — TELEPHONE ENCOUNTER
[facility] [phone]  Called as FYI - patient admitted today for home care services - Skilled nursing 3x weekly x 4 weeks , 2 x weekly x 4 weeks. Also bathing assistance 2 x weekly x 4 weeks.

## 2024-07-05 ENCOUNTER — TELEPHONE (OUTPATIENT)
Dept: PRIMARY CARE | Facility: CLINIC | Age: 74
End: 2024-07-05
Payer: COMMERCIAL

## 2024-07-05 NOTE — TELEPHONE ENCOUNTER
Mat from [facility] stating he just wanted to report pt BP today at 100/52 pulse 64 after meds Wed BP was 114/84 before taking any meds pt is not having any signs of distress just wanted to make the physician aware.

## 2024-07-08 ENCOUNTER — OFFICE VISIT (OUTPATIENT)
Dept: PRIMARY CARE | Facility: CLINIC | Age: 74
End: 2024-07-08
Payer: MEDICARE

## 2024-07-08 VITALS
DIASTOLIC BLOOD PRESSURE: 62 MMHG | OXYGEN SATURATION: 98 % | TEMPERATURE: 97 F | WEIGHT: 265 LBS | BODY MASS INDEX: 40.29 KG/M2 | HEART RATE: 61 BPM | SYSTOLIC BLOOD PRESSURE: 132 MMHG

## 2024-07-08 DIAGNOSIS — R06.02 SHORTNESS OF BREATH: ICD-10-CM

## 2024-07-08 DIAGNOSIS — I50.30 DIASTOLIC CONGESTIVE HEART FAILURE, UNSPECIFIED HF CHRONICITY (MULTI): ICD-10-CM

## 2024-07-08 DIAGNOSIS — Z76.0 MEDICATION REFILL: ICD-10-CM

## 2024-07-08 DIAGNOSIS — I89.0 LYMPHEDEMA DUE TO CHRONIC INFLAMMATION: Primary | ICD-10-CM

## 2024-07-08 PROCEDURE — 99214 OFFICE O/P EST MOD 30 MIN: CPT | Performed by: INTERNAL MEDICINE

## 2024-07-08 PROCEDURE — 1158F ADVNC CARE PLAN TLK DOCD: CPT | Performed by: INTERNAL MEDICINE

## 2024-07-08 PROCEDURE — 1123F ACP DISCUSS/DSCN MKR DOCD: CPT | Performed by: INTERNAL MEDICINE

## 2024-07-08 PROCEDURE — 3078F DIAST BP <80 MM HG: CPT | Performed by: INTERNAL MEDICINE

## 2024-07-08 PROCEDURE — 3075F SYST BP GE 130 - 139MM HG: CPT | Performed by: INTERNAL MEDICINE

## 2024-07-08 PROCEDURE — 1126F AMNT PAIN NOTED NONE PRSNT: CPT | Performed by: INTERNAL MEDICINE

## 2024-07-08 PROCEDURE — 1159F MED LIST DOCD IN RCRD: CPT | Performed by: INTERNAL MEDICINE

## 2024-07-08 RX ORDER — SPIRONOLACTONE 25 MG/1
25 TABLET ORAL DAILY
COMMUNITY
End: 2024-07-08 | Stop reason: SDUPTHER

## 2024-07-08 RX ORDER — FUROSEMIDE 40 MG/1
40 TABLET ORAL 3 TIMES WEEKLY
Start: 2024-07-08 | End: 2024-07-08 | Stop reason: ALTCHOICE

## 2024-07-08 RX ORDER — AMLODIPINE BESYLATE 10 MG/1
10 TABLET ORAL DAILY
Qty: 90 TABLET | Refills: 3 | Status: SHIPPED | OUTPATIENT
Start: 2024-07-08

## 2024-07-08 RX ORDER — FUROSEMIDE 40 MG/1
40 TABLET ORAL DAILY
Qty: 90 TABLET | Refills: 3 | Status: SHIPPED | OUTPATIENT
Start: 2024-07-08 | End: 2025-07-08

## 2024-07-08 RX ORDER — SPIRONOLACTONE 25 MG/1
25 TABLET ORAL DAILY
Qty: 90 TABLET | Refills: 3 | Status: SHIPPED | OUTPATIENT
Start: 2024-07-08 | End: 2025-07-08

## 2024-07-08 RX ORDER — NADOLOL 80 MG/1
80 TABLET ORAL DAILY
Qty: 90 TABLET | Refills: 3 | Status: SHIPPED | OUTPATIENT
Start: 2024-07-08

## 2024-07-08 RX ORDER — FUROSEMIDE 40 MG/1
40 TABLET ORAL DAILY
Start: 2024-07-08 | End: 2024-07-08 | Stop reason: ALTCHOICE

## 2024-07-08 RX ORDER — LOSARTAN POTASSIUM 100 MG/1
100 TABLET ORAL DAILY
COMMUNITY
End: 2024-07-08 | Stop reason: SDUPTHER

## 2024-07-08 RX ORDER — LOSARTAN POTASSIUM 100 MG/1
100 TABLET ORAL DAILY
Qty: 90 TABLET | Refills: 3 | Status: SHIPPED | OUTPATIENT
Start: 2024-07-08 | End: 2025-07-08

## 2024-07-08 ASSESSMENT — PAIN SCALES - GENERAL: PAINLEVEL: 0-NO PAIN

## 2024-07-08 NOTE — PROGRESS NOTES
Subjective   Patient ID: May Pratt is a 74 y.o. female who presents for LIFT CHAIR DOCUMENT.    This is a 74 y.o. presenting for follow up of her hospitalization in March and LTC stay for right foot ulcer, BLE lymphedema and diastolic heart failure. Pt states that since being diuresed aggressively, she has lost about 50 pounds, her leg swelling has improved and her overall health has improved. Her BP meds were optimized and her BP has improved. She ambulates with a rollator and is getting the supplies that she needs at home to aid with her ADLs. She current has difficulty raising herself from a seated position due to knee OA and chronic lymphedema. She is requesting a rx for a lift chair.         Review of Systems   Constitutional:  Negative for chills, fatigue, fever and unexpected weight change.   Respiratory:  Negative for shortness of breath.    Cardiovascular:  Negative for chest pain, palpitations and leg swelling.   Musculoskeletal:  Negative for arthralgias and myalgias.   Skin:         Healing foot ulcer    Neurological:  Negative for dizziness and headaches.   All other systems reviewed and are negative.      Objective   /62   Pulse 61   Temp 36.1 °C (97 °F)   Wt 120 kg (265 lb)   SpO2 98%   BMI 40.29 kg/m²     Physical Exam  Vitals reviewed.   Constitutional:       General: She is not in acute distress.     Appearance: Normal appearance. She is obese. She is not ill-appearing.   Cardiovascular:      Rate and Rhythm: Normal rate and regular rhythm.      Heart sounds: Normal heart sounds.   Pulmonary:      Effort: Pulmonary effort is normal.      Breath sounds: Normal breath sounds.   Musculoskeletal:         General: No swelling or tenderness.      Right lower leg: No edema.      Left lower leg: No edema.   Skin:     Comments: Hyperpigmented, thickened skin noted over bilateral lower extremities   Neurological:      General: No focal deficit present.      Mental Status: She is alert and  oriented to person, place, and time.      Gait: Gait abnormal.   Psychiatric:         Mood and Affect: Mood normal.         Assessment/Plan   Diagnoses and all orders for this visit:  Lymphedema due to chronic inflammation  Comments:  Improved with aggressive diuresis  Shortness of breath  -     furosemide (Lasix) 40 mg tablet; Take 1 tablet (40 mg) by mouth once daily.  Diastolic congestive heart failure, unspecified HF chronicity (Multi)  -     Referral to Cardiology; Future  -     furosemide (Lasix) 40 mg tablet; Take 1 tablet (40 mg) by mouth once daily.  -     losartan (Cozaar) 100 mg tablet; Take 1 tablet (100 mg) by mouth once daily.  -     spironolactone (Aldactone) 25 mg tablet; Take 1 tablet (25 mg) by mouth once daily.  Medication refill  -     amLODIPine (Norvasc) 10 mg tablet; Take 1 tablet (10 mg) by mouth once daily.  -     nadolol (Corgard) 80 mg tablet; Take 1 tablet (80 mg) by mouth once daily.  Other orders  -     Follow Up In Primary Care - Medicare Annual; Future

## 2024-07-09 ENCOUNTER — APPOINTMENT (OUTPATIENT)
Dept: ENDOCRINOLOGY | Facility: CLINIC | Age: 74
End: 2024-07-09
Payer: MEDICARE

## 2024-07-09 RX ORDER — POTASSIUM CHLORIDE 1500 MG/1
2 TABLET, EXTENDED RELEASE ORAL
COMMUNITY
Start: 2024-06-25 | End: 2024-07-09 | Stop reason: SDUPTHER

## 2024-07-09 RX ORDER — POTASSIUM CHLORIDE 20 MEQ/1
40 TABLET, EXTENDED RELEASE ORAL
Qty: 180 TABLET | Refills: 3 | Status: SHIPPED | OUTPATIENT
Start: 2024-07-09 | End: 2025-07-09

## 2024-07-09 ASSESSMENT — ENCOUNTER SYMPTOMS
HEADACHES: 0
DIZZINESS: 0
SHORTNESS OF BREATH: 0
FEVER: 0
FATIGUE: 0
CHILLS: 0
ARTHRALGIAS: 0
PALPITATIONS: 0
UNEXPECTED WEIGHT CHANGE: 0
MYALGIAS: 0

## 2024-07-10 ENCOUNTER — TELEPHONE (OUTPATIENT)
Dept: PRIMARY CARE | Facility: CLINIC | Age: 74
End: 2024-07-10
Payer: COMMERCIAL

## 2024-07-10 NOTE — TELEPHONE ENCOUNTER
Janet from [facility] requesting most recent notes be faxed over for this pt fax number is [phone]

## 2024-07-12 ENCOUNTER — TELEPHONE (OUTPATIENT)
Dept: PRIMARY CARE | Facility: CLINIC | Age: 74
End: 2024-07-12
Payer: COMMERCIAL

## 2024-07-12 NOTE — TELEPHONE ENCOUNTER
Janet from [facility] requesting the notes from this pt's last ov. Notes can be faxed to [phone]

## 2024-07-16 ENCOUNTER — OFFICE VISIT (OUTPATIENT)
Dept: WOUND CARE | Facility: HOSPITAL | Age: 74
End: 2024-07-16
Payer: COMMERCIAL

## 2024-07-16 DIAGNOSIS — R52 PAIN ASSOCIATED WITH WOUND: ICD-10-CM

## 2024-07-16 DIAGNOSIS — E66.01 CLASS 3 SEVERE OBESITY DUE TO EXCESS CALORIES WITH SERIOUS COMORBIDITY AND BODY MASS INDEX (BMI) OF 40.0 TO 44.9 IN ADULT (MULTI): ICD-10-CM

## 2024-07-16 DIAGNOSIS — T14.8XXA PAIN ASSOCIATED WITH WOUND: ICD-10-CM

## 2024-07-16 DIAGNOSIS — I89.0 LYMPHEDEMA: ICD-10-CM

## 2024-07-16 DIAGNOSIS — L89.614: Primary | ICD-10-CM

## 2024-07-16 PROCEDURE — 11042 DBRDMT SUBQ TIS 1ST 20SQCM/<: CPT

## 2024-07-16 PROCEDURE — 87205 SMEAR GRAM STAIN: CPT | Mod: WESLAB | Performed by: SURGERY

## 2024-07-16 PROCEDURE — 99214 OFFICE O/P EST MOD 30 MIN: CPT | Mod: 25

## 2024-07-19 LAB
BACTERIA SPEC CULT: ABNORMAL
GRAM STN SPEC: ABNORMAL
GRAM STN SPEC: ABNORMAL

## 2024-07-23 ENCOUNTER — OFFICE VISIT (OUTPATIENT)
Dept: WOUND CARE | Facility: HOSPITAL | Age: 74
End: 2024-07-23
Payer: MEDICARE

## 2024-07-23 PROCEDURE — 11042 DBRDMT SUBQ TIS 1ST 20SQCM/<: CPT

## 2024-07-26 ENCOUNTER — OFFICE VISIT (OUTPATIENT)
Dept: CARDIOLOGY | Facility: CLINIC | Age: 74
End: 2024-07-26
Payer: MEDICARE

## 2024-07-26 ENCOUNTER — LAB (OUTPATIENT)
Dept: LAB | Facility: LAB | Age: 74
End: 2024-07-26
Payer: COMMERCIAL

## 2024-07-26 ENCOUNTER — APPOINTMENT (OUTPATIENT)
Dept: CARDIOLOGY | Facility: CLINIC | Age: 74
End: 2024-07-26
Payer: MEDICARE

## 2024-07-26 VITALS — HEART RATE: 50 BPM | SYSTOLIC BLOOD PRESSURE: 131 MMHG | RESPIRATION RATE: 18 BRPM | DIASTOLIC BLOOD PRESSURE: 64 MMHG

## 2024-07-26 DIAGNOSIS — I50.41 ACUTE COMBINED SYSTOLIC AND DIASTOLIC HEART FAILURE (MULTI): ICD-10-CM

## 2024-07-26 DIAGNOSIS — D47.2 MGUS (MONOCLONAL GAMMOPATHY OF UNKNOWN SIGNIFICANCE): ICD-10-CM

## 2024-07-26 DIAGNOSIS — D47.2 MGUS (MONOCLONAL GAMMOPATHY OF UNKNOWN SIGNIFICANCE): Primary | ICD-10-CM

## 2024-07-26 DIAGNOSIS — E83.52 HYPERCALCEMIA: ICD-10-CM

## 2024-07-26 LAB
ALBUMIN SERPL-MCNC: 4.3 G/DL (ref 3.5–5)
ALP BLD-CCNC: 101 U/L (ref 35–125)
ALT SERPL-CCNC: 29 U/L (ref 5–40)
ANION GAP SERPL CALC-SCNC: 12 MMOL/L
AST SERPL-CCNC: 27 U/L (ref 5–40)
BILIRUB SERPL-MCNC: 0.4 MG/DL (ref 0.1–1.2)
BUN SERPL-MCNC: 31 MG/DL (ref 8–25)
CALCIUM SERPL-MCNC: 10.2 MG/DL (ref 8.5–10.4)
CHLORIDE SERPL-SCNC: 100 MMOL/L (ref 97–107)
CO2 SERPL-SCNC: 24 MMOL/L (ref 24–31)
CREAT SERPL-MCNC: 1.1 MG/DL (ref 0.4–1.6)
EGFRCR SERPLBLD CKD-EPI 2021: 53 ML/MIN/1.73M*2
GLUCOSE SERPL-MCNC: 100 MG/DL (ref 65–99)
PHOSPHATE SERPL-MCNC: 3.5 MG/DL (ref 2.5–4.5)
POTASSIUM SERPL-SCNC: 5 MMOL/L (ref 3.4–5.1)
PROT SERPL-MCNC: 8.4 G/DL (ref 5.9–7.9)
PTH-INTACT SERPL-MCNC: 300.8 PG/ML (ref 18.5–88)
SODIUM SERPL-SCNC: 136 MMOL/L (ref 133–145)

## 2024-07-26 PROCEDURE — 3078F DIAST BP <80 MM HG: CPT | Performed by: STUDENT IN AN ORGANIZED HEALTH CARE EDUCATION/TRAINING PROGRAM

## 2024-07-26 PROCEDURE — 83970 ASSAY OF PARATHORMONE: CPT

## 2024-07-26 PROCEDURE — 3075F SYST BP GE 130 - 139MM HG: CPT | Performed by: STUDENT IN AN ORGANIZED HEALTH CARE EDUCATION/TRAINING PROGRAM

## 2024-07-26 PROCEDURE — 84100 ASSAY OF PHOSPHORUS: CPT

## 2024-07-26 PROCEDURE — 80053 COMPREHEN METABOLIC PANEL: CPT

## 2024-07-26 PROCEDURE — 99204 OFFICE O/P NEW MOD 45 MIN: CPT | Performed by: STUDENT IN AN ORGANIZED HEALTH CARE EDUCATION/TRAINING PROGRAM

## 2024-07-26 PROCEDURE — 1159F MED LIST DOCD IN RCRD: CPT | Performed by: STUDENT IN AN ORGANIZED HEALTH CARE EDUCATION/TRAINING PROGRAM

## 2024-07-26 PROCEDURE — 1126F AMNT PAIN NOTED NONE PRSNT: CPT | Performed by: STUDENT IN AN ORGANIZED HEALTH CARE EDUCATION/TRAINING PROGRAM

## 2024-07-26 PROCEDURE — 1036F TOBACCO NON-USER: CPT | Performed by: STUDENT IN AN ORGANIZED HEALTH CARE EDUCATION/TRAINING PROGRAM

## 2024-07-26 ASSESSMENT — LIFESTYLE VARIABLES
HOW OFTEN DO YOU HAVE A DRINK CONTAINING ALCOHOL: NEVER
HOW OFTEN DO YOU HAVE SIX OR MORE DRINKS ON ONE OCCASION: NEVER
HOW MANY STANDARD DRINKS CONTAINING ALCOHOL DO YOU HAVE ON A TYPICAL DAY: PATIENT DOES NOT DRINK
AUDIT-C TOTAL SCORE: 0
SKIP TO QUESTIONS 9-10: 1

## 2024-07-26 ASSESSMENT — PAIN SCALES - GENERAL: PAINLEVEL: 0-NO PAIN

## 2024-07-26 ASSESSMENT — PATIENT HEALTH QUESTIONNAIRE - PHQ9
1. LITTLE INTEREST OR PLEASURE IN DOING THINGS: NOT AT ALL
SUM OF ALL RESPONSES TO PHQ9 QUESTIONS 1 AND 2: 0
2. FEELING DOWN, DEPRESSED OR HOPELESS: NOT AT ALL

## 2024-07-26 ASSESSMENT — ENCOUNTER SYMPTOMS
OCCASIONAL FEELINGS OF UNSTEADINESS: 1
LOSS OF SENSATION IN FEET: 0
DEPRESSION: 0

## 2024-07-26 NOTE — PROGRESS NOTES
Subjective   May Pratt is a 74 y.o. female with a reported history of heart failure with preserved ejection fraction, lymphedema, right heel ulcer/cellulitis.    Briefly patient is a 74-year-old female with a medical history of MGUS, hypertension and marked lymphedema.  She was admitted to [facility] March 2024 in the setting of profound volume overload and was diuresed with Lasix.  She had a great response; and since then has noted largely stable lower extremity edema (that she attributes to her lymphedema) and no chest discomfort.  She denies any dyspnea with exertion.  Sleeps flat on her bed.    Congestive heart failure  -Notably she does not have any echocardiograms in the chart.  She does have a history of monoclonal gammopathy of unknown significance  -Was seen at [facility] where she was switched to losartan 100 mg daily, Lasix 80 mg daily, Aldactone 25 mg daily.    Hypertension  -Well-controlled in clinic today      Review of Systems  A comprehensive review of systems was negative.     Past Medical History:   Diagnosis Date    Hypertension     Lymphedema        Past Surgical History:   Procedure Laterality Date    US GUIDED THYROID BIOPSY  8/22/2023    US GUIDED THYROID BIOPSY 8/22/2023 [facility]    US GUIDED THYROID BIOPSY  8/22/2023    US GUIDED THYROID BIOPSY 8/22/2023 [facility]     No Known Allergies  No family history on file.    Objective   Visit Vitals  /64   Pulse 50   Resp 18   Smoking Status Never       General: awake, alert and oriented. No acute distress.   Skin: Skin is warm, dry and intact without rashes or lesions. Appropriate color for ethnicity. Nail beds pink with no cyanosis or clubbing  HEENT: normocephalic, atraumatic; conjunctivae are clear without exudates or hemorrhage. Sclera is non-icteric. Eyelids are normal in appearance without swelling or lesions. Hearing intact. Nares are patent bilaterally. Moist mucous membranes.   Cardiovascular: Regular. No murmurs, gallops, or  rubs are auscultated. S1 and S2 are heard and are of normal intensity. No JVD, no carotid bruits  Respiratory: Thorax symmetric. CTAB, breath sounds vesicular. No crackles, wheezes or ronchi.   Gastrointestinal: soft, non-distended, BS + x 4  Genitourinary: exam deferred  Musculoskeletal: moves all extremities  Extremities: pulses palpable bilaterally; no swelling or erythema; Marked venous stasis changes lower extremities/4+ nonpitting edema (lymphedema)  Neurological: alert & oriented x 3; no focal deficits  Psychiatric: appropriate mood and affect    Current Outpatient Medications   Medication Instructions    acetaminophen (TYLENOL) 1,000 mg, oral, Every 8 hours PRN    albuterol 90 mcg/actuation inhaler 1 puff, inhalation, Every 6 hours PRN    amLODIPine (NORVASC) 10 mg, oral, Daily    furosemide (LASIX) 40 mg, oral, Daily    losartan (COZAAR) 100 mg, oral, Daily    nadolol (CORGARD) 80 mg, oral, Daily    nystatin (Mycostatin) 100,000 unit/gram powder 1 Application, Topical, 2 times daily    polyethylene glycol (GLYCOLAX, MIRALAX) 17 g, oral, Daily PRN    potassium chloride CR 20 mEq ER tablet 40 mEq, oral, Daily (0630)    spironolactone (ALDACTONE) 25 mg, oral, Daily           Lab Review   Lab Results   Component Value Date    HGB 10.4 (L) 05/10/2024    HGB 9.9 (L) 03/28/2024    HGB 9.8 (L) 03/27/2024     05/10/2024    WBC 5.3 05/10/2024     05/10/2024    K 3.3 (L) 05/10/2024    CREATININE 0.46 (L) 05/10/2024    CREATININE 0.65 03/28/2024    CREATININE 0.76 03/27/2024    BUN 10 05/10/2024    CALCIUM 8.8 05/10/2024    INR 1.1 08/01/2023     (H) 05/10/2024    TROPHS 3 05/10/2024    TROPHS 4 05/10/2024        Assessment/Plan   Congestive heart failure  -Notably she does not have any echocardiograms in the chart.  She does have a history of monoclonal gammopathy of unknown significance  -Was seen at [facility] where she was switched to losartan 100 mg daily, Lasix 80 mg daily, Aldactone 25  "mg daily.    Hypertension  -Well-controlled in clinic today    Assessment and plan:  -Currently appears \"warm and dry\" with respect to her congestive heart failure.  Continue Lasix 80 mg, Aldactone 25 mg daily  -Her monoclonal gammopathy of unknown significance in light of significant congestive heart failure is concerning.  Will perform a cardiac MRI to assess whether endomyocardial biopsy to rule out amyloidosis is warranted or not.  Will hold off echocardiogram at this point; since an MRI should provide equivalent information  -CMP todaY  -RTC 3 months     Alex Jiang MD  Advanced Heart Failure/Transplant Cardiology  Cardio-Oncology  [facility] [place]   "

## 2024-07-30 ENCOUNTER — OFFICE VISIT (OUTPATIENT)
Dept: WOUND CARE | Facility: HOSPITAL | Age: 74
End: 2024-07-30
Payer: MEDICARE

## 2024-07-30 PROCEDURE — 11042 DBRDMT SUBQ TIS 1ST 20SQCM/<: CPT

## 2024-08-05 ENCOUNTER — TELEPHONE (OUTPATIENT)
Dept: PRIMARY CARE | Facility: CLINIC | Age: 74
End: 2024-08-05
Payer: COMMERCIAL

## 2024-08-05 NOTE — TELEPHONE ENCOUNTER
Pt home care nurse called to report pt vitals due to pt Pulse being below 60. Nurse states this is an standard routine call. Pt vitals are as follows: BP: 118/60 Pulse: 54 RR: 18 POX: 96% pt is stable and asystematic per HH nurse.

## 2024-08-06 ENCOUNTER — OFFICE VISIT (OUTPATIENT)
Dept: WOUND CARE | Facility: HOSPITAL | Age: 74
End: 2024-08-06
Payer: MEDICARE

## 2024-08-06 PROCEDURE — 11042 DBRDMT SUBQ TIS 1ST 20SQCM/<: CPT

## 2024-08-12 ENCOUNTER — TELEPHONE (OUTPATIENT)
Dept: PRIMARY CARE | Facility: CLINIC | Age: 74
End: 2024-08-12
Payer: COMMERCIAL

## 2024-08-12 DIAGNOSIS — I50.30 DIASTOLIC CONGESTIVE HEART FAILURE, UNSPECIFIED HF CHRONICITY (MULTI): ICD-10-CM

## 2024-08-12 DIAGNOSIS — R06.02 SHORTNESS OF BREATH: ICD-10-CM

## 2024-08-12 RX ORDER — FUROSEMIDE 40 MG/1
80 TABLET ORAL DAILY
Qty: 180 TABLET | Refills: 0 | Status: SHIPPED | OUTPATIENT
Start: 2024-08-12 | End: 2024-11-10

## 2024-08-12 NOTE — TELEPHONE ENCOUNTER
PT states that RX furosemide was sent at only 40 MG. PT states that RX should be for 80 MG, so PT has been taking 2 pills. PT requesting refill sent to [facility] at 80 MG. Please advise.

## 2024-08-12 NOTE — TELEPHONE ENCOUNTER
She has a Cardiologist that should be prescribing this. I will send a RF, but she should reach out to them in the future.

## 2024-08-13 ENCOUNTER — OFFICE VISIT (OUTPATIENT)
Dept: WOUND CARE | Facility: HOSPITAL | Age: 74
End: 2024-08-13
Payer: MEDICARE

## 2024-08-13 PROCEDURE — 97597 DBRDMT OPN WND 1ST 20 CM/<: CPT

## 2024-08-13 NOTE — TELEPHONE ENCOUNTER
Gilmar from [facility] called in to let Dr. Melissa know patients heart rate was 54 yesterday.  Stated they do not need a call back - protocol they need to notify PCP. If need to reach them, number is [phone].

## 2024-08-16 ENCOUNTER — HOSPITAL ENCOUNTER (OUTPATIENT)
Dept: RADIOLOGY | Facility: HOSPITAL | Age: 74
Discharge: HOME | End: 2024-08-16
Payer: MEDICARE

## 2024-08-16 VITALS — BODY MASS INDEX: 40.09 KG/M2 | HEIGHT: 68 IN | WEIGHT: 264.55 LBS

## 2024-08-16 DIAGNOSIS — D47.2 MGUS (MONOCLONAL GAMMOPATHY OF UNKNOWN SIGNIFICANCE): ICD-10-CM

## 2024-08-16 DIAGNOSIS — I50.41 ACUTE COMBINED SYSTOLIC AND DIASTOLIC HEART FAILURE (MULTI): ICD-10-CM

## 2024-08-16 PROCEDURE — A9575 INJ GADOTERATE MEGLUMI 0.1ML: HCPCS | Performed by: STUDENT IN AN ORGANIZED HEALTH CARE EDUCATION/TRAINING PROGRAM

## 2024-08-16 PROCEDURE — 2550000001 HC RX 255 CONTRASTS: Performed by: STUDENT IN AN ORGANIZED HEALTH CARE EDUCATION/TRAINING PROGRAM

## 2024-08-16 PROCEDURE — 75561 CARDIAC MRI FOR MORPH W/DYE: CPT

## 2024-08-16 RX ORDER — GADOTERATE MEGLUMINE 376.9 MG/ML
40 INJECTION INTRAVENOUS
Status: COMPLETED | OUTPATIENT
Start: 2024-08-16 | End: 2024-08-16

## 2024-08-20 ENCOUNTER — OFFICE VISIT (OUTPATIENT)
Dept: WOUND CARE | Facility: HOSPITAL | Age: 74
End: 2024-08-20
Payer: MEDICARE

## 2024-08-20 PROCEDURE — 99213 OFFICE O/P EST LOW 20 MIN: CPT

## 2024-08-27 ENCOUNTER — APPOINTMENT (OUTPATIENT)
Dept: WOUND CARE | Facility: HOSPITAL | Age: 74
End: 2024-08-27
Payer: COMMERCIAL

## 2024-09-12 ENCOUNTER — TELEPHONE (OUTPATIENT)
Dept: PRIMARY CARE | Facility: CLINIC | Age: 74
End: 2024-09-12
Payer: COMMERCIAL

## 2024-09-12 DIAGNOSIS — Z12.31 BREAST CANCER SCREENING BY MAMMOGRAM: Primary | ICD-10-CM

## 2024-10-07 ENCOUNTER — OFFICE VISIT (OUTPATIENT)
Dept: PRIMARY CARE | Facility: CLINIC | Age: 74
End: 2024-10-07
Payer: MEDICARE

## 2024-10-07 VITALS
HEART RATE: 65 BPM | TEMPERATURE: 97.3 F | SYSTOLIC BLOOD PRESSURE: 130 MMHG | OXYGEN SATURATION: 99 % | BODY MASS INDEX: 42.42 KG/M2 | WEIGHT: 279 LBS | DIASTOLIC BLOOD PRESSURE: 60 MMHG

## 2024-10-07 DIAGNOSIS — Z00.00 PHYSICAL EXAM: Primary | ICD-10-CM

## 2024-10-07 ASSESSMENT — PAIN SCALES - GENERAL: PAINLEVEL: 0-NO PAIN

## 2024-10-07 ASSESSMENT — PATIENT HEALTH QUESTIONNAIRE - PHQ9
2. FEELING DOWN, DEPRESSED OR HOPELESS: NOT AT ALL
1. LITTLE INTEREST OR PLEASURE IN DOING THINGS: NOT AT ALL
SUM OF ALL RESPONSES TO PHQ9 QUESTIONS 1 AND 2: 0

## 2024-10-07 NOTE — PROGRESS NOTES
Outpatient Visit Note    Chief Complaint   Patient presents with    Follow-up       HPI:  May Pratt is a 74 y.o. female here for a Medicare Wellness Visit.  Pt is doing well and feeling much better.   Her BP is well controlled.    Health Maintenance    Denies smoking or illicit drug use, drinks no alcoholic beverages a week. Patient reports routine vision checks and dental cleanings, and is increasing her physical activity.     Screening colonoscopy done in 2022 with 10 year clearance. Screening pap N/A. Screening mammogram done 9/2023.  DEXA done 8/2023.     Hearing screen: reports no difficulty with hearing    Does the patient use opioid medications:  No    How does the patient rate their health status today: good    Cognitive Screen:  AAAx3  to person, place and time: Yes  3 word recall: banana, chair, sunshine  - Immediate recall: Yes   - 5 minutes recall: Yes   Impression: No cognitive deficiency observed during screening or encounter today    Reviewed:   Past Medical History/Allergies:  Yes  Family History:  Yes  Social History:  Yes  Current Medications:  Yes  Vital Signs:  Yes  Advanced Directives:  discussed  Immunizations:  reviewed today  Home Safety:                    Up & Go test > 30 seconds?  No                   Home have rugs; lack grab bars in bathroom; lack handrail on stairs; have poor lighting?  No                   Hearing difficulties?  No  Geriatric Assessment                   ADL areas requiring assistance:  Does not need help with Dressing, Eating, Ambulating, Toileting, Grooming, Hygiene.                    IADL areas requiring assistance:  Does not need help with Shopping, Housework, Accounting, Transportation, Driving.   Medications: reviewed  Current supplements:  Reviewed and recorded.   Other providers: Reviewed and recorded         Past Medical History:   Diagnosis Date    Hypertension     Lymphedema         Current Medications  Current Outpatient Medications    Medication Instructions    albuterol 90 mcg/actuation inhaler 1 puff, inhalation, Every 6 hours PRN    amLODIPine (NORVASC) 10 mg, oral, Daily    furosemide (LASIX) 80 mg, oral, Daily    losartan (COZAAR) 100 mg, oral, Daily    nadolol (CORGARD) 80 mg, oral, Daily    potassium chloride CR 20 mEq ER tablet 40 mEq, oral, Daily (0630)    spironolactone (ALDACTONE) 25 mg, oral, Daily        Allergies  No Known Allergies     Immunizations  Immunization History   Administered Date(s) Administered    Flu vaccine, quadrivalent, high-dose, preservative free, age 65y+ (FLUZONE) 11/16/2020, 11/17/2021, 12/01/2022, 10/10/2023    Flu vaccine, trivalent, preservative free, HIGH-DOSE, age 65y+ (Fluzone) 10/05/2018, 11/14/2019, 10/07/2024    Influenza, Unspecified 10/22/2008, 10/24/2009, 10/08/2011, 10/06/2012, 11/02/2013    Influenza, injectable, quadrivalent 10/07/2016, 10/09/2017    Influenza, seasonal, injectable 09/30/1997, 10/06/2012, 11/02/2013, 09/08/2014, 11/11/2015    Moderna COVID-19 vaccine, bivalent, blue cap/gray label *Check age/dose* 11/03/2022    Pneumococcal conjugate vaccine, 13-valent (PREVNAR 13) 04/22/2015    Pneumococcal polysaccharide vaccine, 23-valent, age 2 years and older (PNEUMOVAX 23) 09/02/2016    Tdap vaccine, age 7 year and older (BOOSTRIX, ADACEL) 09/08/2014, 10/07/2024    Zoster, live 12/08/2014        Past Surgical History:   Procedure Laterality Date    US GUIDED THYROID BIOPSY  8/22/2023    US GUIDED THYROID BIOPSY 8/22/2023 [facility]    US GUIDED THYROID BIOPSY  8/22/2023    US GUIDED THYROID BIOPSY 8/22/2023 [facility]     No family history on file.  Social History     Tobacco Use    Smoking status: Never    Smokeless tobacco: Never   Substance Use Topics    Alcohol use: Never    Drug use: Never     Tobacco Use: Low Risk  (10/7/2024)    Patient History     Smoking Tobacco Use: Never     Smokeless Tobacco Use: Never     Passive Exposure: Not on file        ROS  All pertinent positive symptoms are  included in the history of present illness.  All other systems have been reviewed and are negative and noncontributory to this patient's current ailments.    VITAL SIGNS  Vitals:    10/07/24 1554   BP: 130/60   Pulse: 65   Temp: 36.3 °C (97.3 °F)   SpO2: 99%     Vitals:    10/07/24 1554   Weight: 127 kg (279 lb)      Body mass index is 42.42 kg/m².     PHYSICAL EXAM  GENERAL APPEARANCE: well nourished, well developed, looks like stated age, in no acute distress, not ill or tired appearing, conversing well.   HEENT: no trauma, normocephalic. PERRLA and EOMI with normal external exam. TM's intact with no injection or effusion, no signs of infection. Nares patent, turbinates pink without discharge. Pharynx pink with no exudates or lesions, no enlarged tonsils.   NECK: no nodes, supple without rigidity, no neck mass was observed, no thyromegaly or thyroid nodules.   HEART: regular rate and rhythm, S1 and S2 heard with no murmurs or skipped beats, no carotid bruits.   LUNGS: clear to auscultation bilaterally with no wheezes, crackles or rales.   ABDOMEN: no organomegaly, soft, nontender, nondistended, normal bowel sounds, no guarding/rebound/rigidity.   EXTREMITIES: moving all extremities equally with no edema or deformities.   SKIN: normal skin color and pigmentation, normal skin turgor without rash, lesions, or nodules visualized.   NEUROLOGIC EXAM: CN II-XII grossly intact, normal gait, normal balance, 5/5 muscle strength, sensation grossly intact.   PSYCH: mood and affect appropriate; alert and oriented to time, place, person; no difficulty with speech or language.   LYMPH NODES: no cervical lymphadenopathy.         Assessment/Plan    Physical exam    This was a shared decision making visit.    Next Wellness Exam Due  In 1 year from today      Katie Melissa MD   10/07/24   9:10 PM      Healthy diet and exercise encouraged. Low fat, low salt diet. Drink plenty of fluids. Exercise at least 5 times/week for at least  45 minutes per day.

## 2024-10-09 ENCOUNTER — HOSPITAL ENCOUNTER (OUTPATIENT)
Dept: RADIOLOGY | Facility: HOSPITAL | Age: 74
Discharge: HOME | End: 2024-10-09
Payer: COMMERCIAL

## 2024-10-09 VITALS — WEIGHT: 275 LBS | BODY MASS INDEX: 44.2 KG/M2 | HEIGHT: 66 IN

## 2024-10-09 DIAGNOSIS — Z12.31 BREAST CANCER SCREENING BY MAMMOGRAM: ICD-10-CM

## 2024-10-09 PROCEDURE — 77067 SCR MAMMO BI INCL CAD: CPT

## 2024-10-09 PROCEDURE — 77067 SCR MAMMO BI INCL CAD: CPT | Performed by: RADIOLOGY

## 2024-10-09 PROCEDURE — 77063 BREAST TOMOSYNTHESIS BI: CPT | Performed by: RADIOLOGY

## 2024-10-25 ENCOUNTER — OFFICE VISIT (OUTPATIENT)
Dept: CARDIOLOGY | Facility: CLINIC | Age: 74
End: 2024-10-25
Payer: COMMERCIAL

## 2024-10-25 VITALS
SYSTOLIC BLOOD PRESSURE: 188 MMHG | OXYGEN SATURATION: 98 % | BODY MASS INDEX: 43.74 KG/M2 | DIASTOLIC BLOOD PRESSURE: 76 MMHG | HEART RATE: 56 BPM | WEIGHT: 271 LBS

## 2024-10-25 DIAGNOSIS — I50.32 CHRONIC DIASTOLIC CONGESTIVE HEART FAILURE: ICD-10-CM

## 2024-10-25 DIAGNOSIS — I10 ESSENTIAL (PRIMARY) HYPERTENSION: Primary | ICD-10-CM

## 2024-10-25 PROCEDURE — 3078F DIAST BP <80 MM HG: CPT | Performed by: STUDENT IN AN ORGANIZED HEALTH CARE EDUCATION/TRAINING PROGRAM

## 2024-10-25 PROCEDURE — 1159F MED LIST DOCD IN RCRD: CPT | Performed by: STUDENT IN AN ORGANIZED HEALTH CARE EDUCATION/TRAINING PROGRAM

## 2024-10-25 PROCEDURE — 99214 OFFICE O/P EST MOD 30 MIN: CPT | Performed by: STUDENT IN AN ORGANIZED HEALTH CARE EDUCATION/TRAINING PROGRAM

## 2024-10-25 PROCEDURE — G2211 COMPLEX E/M VISIT ADD ON: HCPCS | Performed by: STUDENT IN AN ORGANIZED HEALTH CARE EDUCATION/TRAINING PROGRAM

## 2024-10-25 PROCEDURE — 1036F TOBACCO NON-USER: CPT | Performed by: STUDENT IN AN ORGANIZED HEALTH CARE EDUCATION/TRAINING PROGRAM

## 2024-10-25 PROCEDURE — 3077F SYST BP >= 140 MM HG: CPT | Performed by: STUDENT IN AN ORGANIZED HEALTH CARE EDUCATION/TRAINING PROGRAM

## 2024-10-25 ASSESSMENT — LIFESTYLE VARIABLES
HOW OFTEN DO YOU HAVE SIX OR MORE DRINKS ON ONE OCCASION: NEVER
HOW OFTEN DO YOU HAVE A DRINK CONTAINING ALCOHOL: NEVER
AUDIT-C TOTAL SCORE: 0
SKIP TO QUESTIONS 9-10: 1
HOW MANY STANDARD DRINKS CONTAINING ALCOHOL DO YOU HAVE ON A TYPICAL DAY: PATIENT DOES NOT DRINK

## 2024-10-25 ASSESSMENT — PATIENT HEALTH QUESTIONNAIRE - PHQ9
SUM OF ALL RESPONSES TO PHQ9 QUESTIONS 1 AND 2: 0
1. LITTLE INTEREST OR PLEASURE IN DOING THINGS: NOT AT ALL
2. FEELING DOWN, DEPRESSED OR HOPELESS: NOT AT ALL

## 2024-10-25 ASSESSMENT — ENCOUNTER SYMPTOMS
OCCASIONAL FEELINGS OF UNSTEADINESS: 1
DEPRESSION: 0
LOSS OF SENSATION IN FEET: 0

## 2024-10-25 NOTE — PROGRESS NOTES
Subjective   May Pratt is a 74 y.o. female with a reported history of heart failure with preserved ejection fraction, lymphedema, right heel ulcer/cellulitis.    Briefly patient is a 74-year-old female with a medical history of MGUS, hypertension and marked lymphedema.  She was admitted to [facility] March 2024 in the setting of profound volume overload and was diuresed with Lasix.  She had a great response; and since then has noted largely stable lower extremity edema (that she attributes to her lymphedema) and no chest discomfort.  She denies any dyspnea with exertion.  Sleeps flat on her bed.    Congestive heart failure  -Notably she does not have any echocardiograms in the chart.  She does have a history of monoclonal gammopathy of unknown significance  -Was seen at [facility] where she was switched to losartan 100 mg daily, Lasix 80 mg daily, Aldactone 25 mg daily.    Hypertension  -Uncontrolled in clinic today; however the patient has not taken any of her 4 antihypertensives this morning; checks her blood pressure at home and is usually well-controlled    Cardiac MRI performed 8/16/2024 shows preserved biventricular function, mild left ventricular hypertrophy (asymmetric).  No clear evidence of cardiac amyloid    Review of Systems  A comprehensive review of systems was negative.     Past Medical History:   Diagnosis Date    Hypertension     Lymphedema        Past Surgical History:   Procedure Laterality Date    US GUIDED THYROID BIOPSY  8/22/2023    US GUIDED THYROID BIOPSY 8/22/2023 [facility] US    US GUIDED THYROID BIOPSY  8/22/2023    [place] GUIDED [place] [facility] [place]     No Known Allergies  No family history on file.    Objective   Visit Vitals  BP (!) 188/76 Comment: left arm machine patient has not had medication this morning   Pulse 56   Wt 123 kg (271 lb)   SpO2 98%   BMI 43.74 kg/m²   OB Status Postmenopausal   Smoking Status Never   BSA 2.39 m²       General: awake, alert and oriented.  No acute distress.   Skin: Skin is warm, dry and intact without rashes or lesions. Appropriate color for ethnicity. Nail beds pink with no cyanosis or clubbing  HEENT: normocephalic, atraumatic; conjunctivae are clear without exudates or hemorrhage. Sclera is non-icteric. Eyelids are normal in appearance without swelling or lesions. Hearing intact. Nares are patent bilaterally. Moist mucous membranes.   Cardiovascular: Regular. No murmurs, gallops, or rubs are auscultated. S1 and S2 are heard and are of normal intensity. No JVD, no carotid bruits  Respiratory: Thorax symmetric. CTAB, breath sounds vesicular. No crackles, wheezes or ronchi.   Gastrointestinal: soft, non-distended, BS + x 4  Genitourinary: exam deferred  Musculoskeletal: moves all extremities  Extremities: pulses palpable bilaterally; no swelling or erythema; Marked venous stasis changes lower extremities/4+ nonpitting edema (lymphedema)  Neurological: alert & oriented x 3; no focal deficits  Psychiatric: appropriate mood and affect    Current Outpatient Medications   Medication Instructions    albuterol 90 mcg/actuation inhaler 1 puff, inhalation, Every 6 hours PRN    amLODIPine (NORVASC) 10 mg, oral, Daily    furosemide (LASIX) 80 mg, oral, Daily    losartan (COZAAR) 100 mg, oral, Daily    nadolol (CORGARD) 80 mg, oral, Daily    potassium chloride CR 20 mEq ER tablet 40 mEq, oral, Daily (0630)    spironolactone (ALDACTONE) 25 mg, oral, Daily           Lab Review   Lab Results   Component Value Date    HGB 10.4 (L) 05/10/2024    HGB 9.9 (L) 03/28/2024    HGB 9.8 (L) 03/27/2024     05/10/2024    WBC 5.3 05/10/2024     07/26/2024    K 5.0 07/26/2024    CREATININE 1.10 07/26/2024    CREATININE 0.46 (L) 05/10/2024    CREATININE 0.65 03/28/2024    BUN 31 (H) 07/26/2024    CALCIUM 10.2 07/26/2024    INR 1.1 08/01/2023     (H) 05/10/2024    TROPHS 3 05/10/2024    TROPHS 4 05/10/2024        Assessment/Plan   Congestive heart  "failure  -Notably she does not have any echocardiograms in the chart.  She does have a history of monoclonal gammopathy of unknown significance  -Was seen at [facility] where she was switched to losartan 100 mg daily, Lasix 80 mg daily, Aldactone 25 mg daily.    Hypertension  -Uncontrolled in clinic today    Assessment and plan:  -Currently appears \"warm and dry\" with respect to her congestive heart failure.  Continue Lasix 80 mg, Aldactone 25 mg daily  -Blood pressure is uncontrolled; however she has not taken any of her 4 antihypertensives this morning.  I encouraged her to take her medications and keep checking her blood pressure at home; and to call us if the systolic is persistently elevated  -RTC 6 months     Alex Jiang MD  Advanced Heart Failure/Transplant Cardiology  Cardio-Oncology  [facility]   "

## 2024-11-09 DIAGNOSIS — I50.30 DIASTOLIC CONGESTIVE HEART FAILURE, UNSPECIFIED HF CHRONICITY: ICD-10-CM

## 2024-11-09 DIAGNOSIS — R06.02 SHORTNESS OF BREATH: ICD-10-CM

## 2024-11-11 RX ORDER — FUROSEMIDE 40 MG/1
80 TABLET ORAL DAILY
Qty: 180 TABLET | Refills: 1 | Status: SHIPPED | OUTPATIENT
Start: 2024-11-11 | End: 2025-05-10

## 2025-01-21 DIAGNOSIS — R06.02 SHORTNESS OF BREATH: ICD-10-CM

## 2025-01-21 DIAGNOSIS — I50.30 DIASTOLIC CONGESTIVE HEART FAILURE, UNSPECIFIED HF CHRONICITY: ICD-10-CM

## 2025-01-21 NOTE — TELEPHONE ENCOUNTER
Pt lvm requesting to speak with Dr. Melissa's nurse stating she has a question regarding her insurance

## 2025-01-22 RX ORDER — FUROSEMIDE 40 MG/1
80 TABLET ORAL DAILY
Qty: 180 TABLET | Refills: 3 | Status: SHIPPED | OUTPATIENT
Start: 2025-01-22 | End: 2026-01-17

## 2025-01-22 NOTE — TELEPHONE ENCOUNTER
Call placed to pt stating the physician ordered a rollator but the bill went to [facility] pt stating the bill should have went to her primary insurance medicare pt is wanting to know which is listed as primary and secondary pt advised medicare is listed as primary and aime secondary        Pt also requesting rx refill. Requesting for the furosimide to go through [facility] last ov 10/7/24

## 2025-04-07 ENCOUNTER — OFFICE VISIT (OUTPATIENT)
Dept: PRIMARY CARE | Facility: CLINIC | Age: 75
End: 2025-04-07
Payer: MEDICARE

## 2025-04-07 VITALS
OXYGEN SATURATION: 99 % | DIASTOLIC BLOOD PRESSURE: 80 MMHG | HEART RATE: 62 BPM | TEMPERATURE: 98.2 F | WEIGHT: 293 LBS | BODY MASS INDEX: 48.58 KG/M2 | SYSTOLIC BLOOD PRESSURE: 130 MMHG

## 2025-04-07 DIAGNOSIS — E66.01 MORBID OBESITY WITH BMI OF 45.0-49.9, ADULT (MULTI): ICD-10-CM

## 2025-04-07 DIAGNOSIS — I50.9 CHRONIC HEART FAILURE, UNSPECIFIED HEART FAILURE TYPE: ICD-10-CM

## 2025-04-07 DIAGNOSIS — I10 ESSENTIAL (PRIMARY) HYPERTENSION: Primary | ICD-10-CM

## 2025-04-07 DIAGNOSIS — E21.0 PRIMARY HYPERPARATHYROIDISM (MULTI): ICD-10-CM

## 2025-04-07 PROCEDURE — 99213 OFFICE O/P EST LOW 20 MIN: CPT | Performed by: INTERNAL MEDICINE

## 2025-04-07 PROCEDURE — 3079F DIAST BP 80-89 MM HG: CPT | Performed by: INTERNAL MEDICINE

## 2025-04-07 PROCEDURE — 1159F MED LIST DOCD IN RCRD: CPT | Performed by: INTERNAL MEDICINE

## 2025-04-07 PROCEDURE — 3075F SYST BP GE 130 - 139MM HG: CPT | Performed by: INTERNAL MEDICINE

## 2025-04-07 PROCEDURE — 1126F AMNT PAIN NOTED NONE PRSNT: CPT | Performed by: INTERNAL MEDICINE

## 2025-04-07 PROCEDURE — G2211 COMPLEX E/M VISIT ADD ON: HCPCS | Performed by: INTERNAL MEDICINE

## 2025-04-07 ASSESSMENT — PAIN SCALES - GENERAL: PAINLEVEL_OUTOF10: 0-NO PAIN

## 2025-04-07 NOTE — PROGRESS NOTES
Subjective   Patient ID: May Pratt is a 74 y.o. female who presents for Blood Pressure Check.    This is 74 y.o. presenting for 6 mos follow up of HTN. Her BP has been well controlled since her medications were adjusted by Cardiology. Her lymphedema has improved drastically with diuretics and her right foot wound has healed.  Pt is feeling well and is hoping to increase her physical activity. She has gained 30 pounds over the past 6 mos.          Review of Systems   Constitutional:  Negative for activity change, appetite change, chills, fatigue and fever.   Respiratory:  Negative for shortness of breath.    Cardiovascular:  Negative for chest pain and leg swelling.   Neurological:  Negative for dizziness and headaches.       Objective   /80   Pulse 62   Temp 36.8 °C (98.2 °F)   Wt 137 kg (301 lb)   SpO2 99%   BMI 48.58 kg/m²     Physical Exam  Vitals reviewed.   Constitutional:       General: She is not in acute distress.     Appearance: Normal appearance. She is obese. She is not ill-appearing.   Cardiovascular:      Rate and Rhythm: Normal rate and regular rhythm.      Heart sounds: Normal heart sounds.   Pulmonary:      Effort: Pulmonary effort is normal.      Breath sounds: Normal breath sounds.   Musculoskeletal:         General: No swelling or tenderness.   Skin:     Comments: Thickened, hyperpigmented skin overlying lower legs   Neurological:      General: No focal deficit present.      Mental Status: She is alert and oriented to person, place, and time.   Psychiatric:         Mood and Affect: Mood normal.         Assessment/Plan   Diagnoses and all orders for this visit:  Essential (primary) hypertension  Comments:  Continue current medications  Primary hyperparathyroidism (Multi)  Comments:  Unde the care of ENDO  Chronic heart failure, unspecified heart failure type  Comments:  Stable on current meds. Under the care of CARDs.  Morbid obesity with BMI of 45.0-49.9, adult  (Multi)  Comments:  Healthy diet and exercise encouraged  Other orders  -     Follow Up In Primary Care - Medicare Annual; Future

## 2025-04-08 PROBLEM — E21.0 PRIMARY HYPERPARATHYROIDISM (MULTI): Status: ACTIVE | Noted: 2025-04-08

## 2025-04-08 ASSESSMENT — ENCOUNTER SYMPTOMS
FEVER: 0
CHILLS: 0
FATIGUE: 0
SHORTNESS OF BREATH: 0
APPETITE CHANGE: 0
ACTIVITY CHANGE: 0
DIZZINESS: 0
HEADACHES: 0

## 2025-04-11 ENCOUNTER — APPOINTMENT (OUTPATIENT)
Dept: CARDIOLOGY | Facility: CLINIC | Age: 75
End: 2025-04-11
Payer: MEDICARE

## 2025-04-25 ENCOUNTER — APPOINTMENT (OUTPATIENT)
Dept: CARDIOLOGY | Facility: CLINIC | Age: 75
End: 2025-04-25
Payer: MEDICARE

## 2025-05-08 NOTE — PROGRESS NOTES
Primary Care Physician: Katie Melissa MD  Date of Visit: 2025 11:20 AM EDT      Chief Complaint:   Establish new care     HPI / Summary:   May Pratt is a 74 y.o. female with history of MGUS, HTN, HFpEF, obesity, chronic lymphedema, hyperparathyroidism    Here to establish care after Dr. Jiang has left .  Last seen in 2024 at that visit her blood pressure was uncontrolled but she had not taken any of her 4 antihypertensives.  She had hospitalization at [facility] 2024 in the setting of profound volume overload and diuresed with Lasix.  She required prolonged recovery at nursing facility until summer 2024.  She then underwent cardiac MRI 2024 as below showing preserved LVEF with mild LVH and no evidence of cardiac amyloid.    States been compliant with amlodipine, furosemide, losartan, nadolol and spironolactone blood pressures are well-controlled.  She denies any chest pain or pressure.  No dyspnea.        Last Cardiology Tests:  EC/9/2025: Sinus bradycardia with HR 57 bpm, first-degree AVB (228 MS), low voltage  5/10/2024 sinus rhythm with 1st degree AV block     Echo:    Cath:      Stress Test:    Cardiac Imaging:  Cardiac MRI 2024:  1. The left ventricle is normal in size, shape, and has hyperdynamic  systolic function. LVEF = 75%. There are no segmental wall motion  abnormalities.  Quantitative values are as noted above.  2. The extracellular volume is mildly increased measuring 33%,  however the myocardial T1 and T2 mapping times are within normal  limits and there is no evidence of delayed enhancement. Aside from  the mildly increased extracellular volume, there are no other  findings to suggest cardiac amyloidosis.  3. Mild basal septal hypertrophy measuring up to 1.3 cm.  4. Normal aortic, mitral, and tricuspid valve function.  5. Mildly dilated left atrium.    Past Medical History:  Medical History[1]     Past Surgical History:  Surgical History[2]       Social  History:  She reports that she has never smoked. She has never used smokeless tobacco. She reports that she does not drink alcohol and does not use drugs.    Family History:  family history is not on file.      Allergies:  RX Allergies[3]    Outpatient Medications:  Current Outpatient Medications   Medication Instructions    albuterol 90 mcg/actuation inhaler 1 puff, inhalation, Every 6 hours PRN    amLODIPine (NORVASC) 10 mg, oral, Daily    furosemide (LASIX) 80 mg, oral, Daily    losartan (COZAAR) 100 mg, oral, Daily    nadolol (CORGARD) 80 mg, oral, Daily    potassium chloride CR 20 mEq ER tablet 40 mEq, oral, Daily (0630)    spironolactone (ALDACTONE) 25 mg, oral, Daily       Review of Systems:  A complete 10 point ROS was performed and is negative except for HPI    Physical Exam:  GENERAL: alert, cooperative, pleasant, in no acute distress  SKIN: warm, dry, no rash.  NECK: no JVD, no OLMAN  CARDIAC: Regular rate and rhythm with no rubs, murmurs, or gallops  CHEST: Normal respiratory efforts, lungs clear to auscultation bilaterally.  ABDOMEN: soft, nontender, nondistended  EXTREMITIES: Chronic leathery type appearance to both legs consistent with persistent and chronic lymphedema  NEURO: Alert and oriented x 3.  Grossly normal.  Moves all 4 extremities.    Vitals:    05/09/25 1118   BP: 106/60   BP Location: Left arm   Patient Position: Sitting   Pulse: 58   SpO2: 94%   Weight: 137 kg (301 lb)     Wt Readings from Last 5 Encounters:   04/07/25 137 kg (301 lb)   10/25/24 123 kg (271 lb)   10/09/24 125 kg (275 lb)   10/07/24 127 kg (279 lb)   08/16/24 120 kg (264 lb 8.8 oz)     Body mass index is 48.58 kg/m².        Last Labs:  CMP:  Recent Labs     07/26/24  1437 05/10/24  1320 03/28/24  0815    141 143   K 5.0 3.3* 3.9    105 111*   CO2 24 28 23   ANIONGAP 12 11 13   BUN 31* 10 20   CREATININE 1.10 0.46* 0.65   EGFR 53* >90 >90   GLUCOSE 100* 98 122*     Recent Labs     07/26/24  1437 05/10/24  1320  03/28/24  0815 03/27/24  0847 03/25/24  1853   ALBUMIN 4.3 3.6 2.6*   < > 3.8   ALKPHOS 101 66  --   --  86   ALT 29 10  --   --  17   AST 27 14  --   --  36   BILITOT 0.4 0.7  --   --  0.7    < > = values in this interval not displayed.     CBC:  Recent Labs     05/10/24  1320 03/28/24  0815 03/27/24  0847   WBC 5.3 6.0 6.0   HGB 10.4* 9.9* 9.8*   HCT 33.9* 33.3* 32.1*    192 189   MCV 82 83 81     COAG:   Recent Labs     08/01/23  1617   INR 1.1     ENDO:  Recent Labs     03/25/24  1853 08/01/23  1617 12/16/22  0921 12/09/21  1204 11/16/20  0930 11/14/19  0906   TSH  --  1.87 1.98  --  1.78 2.26   HGBA1C 5.7*  --  5.8 5.7 6.0 6.0      CARDIAC:   Recent Labs     05/10/24  1924 05/10/24  1320   TROPHS 3 4   BNP  --  157*     Recent Labs     12/15/23  0815 12/16/22  0921 12/09/21  1204   CHOL 194 194 186   LDLCALC 95 101 89   HDL 87.9 81 86   TRIG 55 62 56             Assessment/Plan       #chronic diastolic HF  -stable  -EF 75% on cardiac MRI.  Will consider repeat echocardiogram next year.  -continue lasix, spironolactone, losartan  -Recheck BMP as borderline increased potassium previously.    #primary HTN  -well controlled. Continue amlodipine, spironolactone, losartan, lasix    #Obesity  Activity as tolerates.  Using walker intermittently.    #MGUS - refer to hem/onc.  Previously seen at [facility] but wants to establish here.    #Lymphedema  Diuretics as above    Follow-up about 6 months    Followup Appts:  Future Appointments   Date Time Provider Department Center   10/6/2025  3:30 PM Katie Melissa MD [id number]   11/18/2025 10:40 AM Roosevelt Sylvester DO [id number]           ____________________________________________________________  Roosevelt Sylvester DO  [facility]         [1]   Past Medical History:  Diagnosis Date    Hypertension     Lymphedema    [2]   Past Surgical History:  Procedure Laterality Date    US GUIDED THYROID BIOPSY  8/22/2023    US  GUIDED THYROID BIOPSY 8/22/2023 [facility] US    US GUIDED THYROID BIOPSY  8/22/2023    US GUIDED THYROID BIOPSY 8/22/2023 [facility] US   [3] No Known Allergies

## 2025-05-09 ENCOUNTER — OFFICE VISIT (OUTPATIENT)
Dept: CARDIOLOGY | Facility: CLINIC | Age: 75
End: 2025-05-09
Payer: MEDICARE

## 2025-05-09 VITALS
DIASTOLIC BLOOD PRESSURE: 60 MMHG | SYSTOLIC BLOOD PRESSURE: 106 MMHG | WEIGHT: 293 LBS | HEART RATE: 58 BPM | OXYGEN SATURATION: 94 % | BODY MASS INDEX: 48.58 KG/M2

## 2025-05-09 DIAGNOSIS — I10 ESSENTIAL (PRIMARY) HYPERTENSION: ICD-10-CM

## 2025-05-09 DIAGNOSIS — D47.2 MGUS (MONOCLONAL GAMMOPATHY OF UNKNOWN SIGNIFICANCE): ICD-10-CM

## 2025-05-09 DIAGNOSIS — I50.32 CHRONIC DIASTOLIC CONGESTIVE HEART FAILURE: Primary | ICD-10-CM

## 2025-05-09 DIAGNOSIS — I89.0 LYMPHEDEMA: ICD-10-CM

## 2025-05-09 LAB
ATRIAL RATE: 57 BPM
P AXIS: 59 DEGREES
P OFFSET: 165 MS
P ONSET: 104 MS
PR INTERVAL: 228 MS
Q ONSET: 218 MS
QRS COUNT: 9 BEATS
QRS DURATION: 72 MS
QT INTERVAL: 420 MS
QTC CALCULATION(BAZETT): 408 MS
QTC FREDERICIA: 413 MS
R AXIS: 13 DEGREES
T AXIS: 56 DEGREES
T OFFSET: 428 MS
VENTRICULAR RATE: 57 BPM

## 2025-05-09 PROCEDURE — 1036F TOBACCO NON-USER: CPT | Performed by: INTERNAL MEDICINE

## 2025-05-09 PROCEDURE — 1159F MED LIST DOCD IN RCRD: CPT | Performed by: INTERNAL MEDICINE

## 2025-05-09 PROCEDURE — G2211 COMPLEX E/M VISIT ADD ON: HCPCS | Performed by: INTERNAL MEDICINE

## 2025-05-09 PROCEDURE — 3078F DIAST BP <80 MM HG: CPT | Performed by: INTERNAL MEDICINE

## 2025-05-09 PROCEDURE — 3074F SYST BP LT 130 MM HG: CPT | Performed by: INTERNAL MEDICINE

## 2025-05-09 PROCEDURE — 99204 OFFICE O/P NEW MOD 45 MIN: CPT | Performed by: INTERNAL MEDICINE

## 2025-05-09 PROCEDURE — 93005 ELECTROCARDIOGRAM TRACING: CPT | Performed by: INTERNAL MEDICINE

## 2025-05-09 PROCEDURE — 99214 OFFICE O/P EST MOD 30 MIN: CPT | Performed by: INTERNAL MEDICINE

## 2025-05-09 RX ORDER — TURMERIC 400 MG
CAPSULE ORAL
COMMUNITY

## 2025-05-09 RX ORDER — ACETAMINOPHEN 500 MG
TABLET ORAL DAILY
COMMUNITY

## 2025-05-12 DIAGNOSIS — I10 ESSENTIAL (PRIMARY) HYPERTENSION: ICD-10-CM

## 2025-05-12 DIAGNOSIS — Z76.0 MEDICATION REFILL: ICD-10-CM

## 2025-05-13 DIAGNOSIS — J40 BRONCHITIS: ICD-10-CM

## 2025-05-13 RX ORDER — AMLODIPINE BESYLATE 10 MG/1
10 TABLET ORAL DAILY
Qty: 90 TABLET | Refills: 3 | Status: SHIPPED | OUTPATIENT
Start: 2025-05-13

## 2025-05-13 RX ORDER — ALBUTEROL SULFATE 90 UG/1
1 INHALANT RESPIRATORY (INHALATION) EVERY 6 HOURS PRN
Qty: 18 G | Refills: 3 | Status: SHIPPED | OUTPATIENT
Start: 2025-05-13

## 2025-05-18 LAB
ANION GAP SERPL CALCULATED.4IONS-SCNC: 9 MMOL/L (CALC) (ref 7–17)
BUN SERPL-MCNC: 31 MG/DL (ref 7–25)
BUN/CREAT SERPL: 26 (CALC) (ref 6–22)
CALCIUM SERPL-MCNC: 10.2 MG/DL (ref 8.6–10.4)
CHLORIDE SERPL-SCNC: 104 MMOL/L (ref 98–110)
CO2 SERPL-SCNC: 26 MMOL/L (ref 20–32)
CREAT SERPL-MCNC: 1.19 MG/DL (ref 0.6–1)
EGFRCR SERPLBLD CKD-EPI 2021: 48 ML/MIN/1.73M2
GLUCOSE SERPL-MCNC: 103 MG/DL (ref 65–99)
POTASSIUM SERPL-SCNC: 5.2 MMOL/L (ref 3.5–5.3)
SODIUM SERPL-SCNC: 139 MMOL/L (ref 135–146)

## 2025-05-19 ENCOUNTER — TELEPHONE (OUTPATIENT)
Dept: CARDIOLOGY | Facility: CLINIC | Age: 75
End: 2025-05-19
Payer: MEDICARE

## 2025-05-19 NOTE — TELEPHONE ENCOUNTER
TCT patient and left detailed message on self identified VM with office number to call with any questions.

## 2025-05-19 NOTE — TELEPHONE ENCOUNTER
----- Message from Roosevelt Sylvester sent at 5/19/2025 11:18 AM EDT -----  Kidney function still mildly abnormal but consistent with what it was previously.  Continue current medications and follow-up as scheduled in 6 months.  ----- Message -----  From: [facility] Results In  Sent: 5/18/2025   2:11 AM EDT  To: Roosevelt Sylvester, DO

## 2025-05-28 DIAGNOSIS — Z76.0 MEDICATION REFILL: ICD-10-CM

## 2025-05-28 DIAGNOSIS — I10 ESSENTIAL (PRIMARY) HYPERTENSION: ICD-10-CM

## 2025-05-28 DIAGNOSIS — I50.30 DIASTOLIC CONGESTIVE HEART FAILURE, UNSPECIFIED HF CHRONICITY: ICD-10-CM

## 2025-05-29 RX ORDER — POTASSIUM CHLORIDE 20 MEQ/1
TABLET, EXTENDED RELEASE ORAL
Qty: 180 TABLET | Refills: 3 | Status: SHIPPED | OUTPATIENT
Start: 2025-05-29

## 2025-05-29 RX ORDER — LOSARTAN POTASSIUM 100 MG/1
100 TABLET ORAL DAILY
Qty: 90 TABLET | Refills: 3 | Status: SHIPPED | OUTPATIENT
Start: 2025-05-29

## 2025-05-29 RX ORDER — SPIRONOLACTONE 25 MG/1
25 TABLET ORAL DAILY
Qty: 90 TABLET | Refills: 3 | Status: SHIPPED | OUTPATIENT
Start: 2025-05-29

## 2025-06-06 ENCOUNTER — OFFICE VISIT (OUTPATIENT)
Dept: HEMATOLOGY/ONCOLOGY | Facility: HOSPITAL | Age: 75
End: 2025-06-06
Payer: COMMERCIAL

## 2025-06-06 ENCOUNTER — LAB (OUTPATIENT)
Dept: LAB | Facility: HOSPITAL | Age: 75
End: 2025-06-06
Payer: COMMERCIAL

## 2025-06-06 VITALS
HEART RATE: 53 BPM | SYSTOLIC BLOOD PRESSURE: 147 MMHG | DIASTOLIC BLOOD PRESSURE: 82 MMHG | TEMPERATURE: 97 F | OXYGEN SATURATION: 98 %

## 2025-06-06 DIAGNOSIS — D47.2 MGUS (MONOCLONAL GAMMOPATHY OF UNKNOWN SIGNIFICANCE): ICD-10-CM

## 2025-06-06 DIAGNOSIS — D47.2 MGUS (MONOCLONAL GAMMOPATHY OF UNKNOWN SIGNIFICANCE): Primary | ICD-10-CM

## 2025-06-06 DIAGNOSIS — I10 ESSENTIAL (PRIMARY) HYPERTENSION: ICD-10-CM

## 2025-06-06 LAB
ALBUMIN SERPL BCP-MCNC: 4.3 G/DL (ref 3.4–5)
ALP SERPL-CCNC: 107 U/L (ref 33–136)
ALT SERPL W P-5'-P-CCNC: 11 U/L (ref 7–45)
ANION GAP SERPL CALC-SCNC: 13 MMOL/L (ref 10–20)
AST SERPL W P-5'-P-CCNC: 14 U/L (ref 9–39)
B2 MICROGLOB SERPL-MCNC: 5.2 MG/L (ref 0.7–2.2)
BASOPHILS # BLD AUTO: 0.06 X10*3/UL (ref 0–0.1)
BASOPHILS NFR BLD AUTO: 1.1 %
BILIRUB SERPL-MCNC: 0.7 MG/DL (ref 0–1.2)
BUN SERPL-MCNC: 36 MG/DL (ref 6–23)
CALCIUM SERPL-MCNC: 10.6 MG/DL (ref 8.6–10.3)
CHLORIDE SERPL-SCNC: 102 MMOL/L (ref 98–107)
CO2 SERPL-SCNC: 28 MMOL/L (ref 21–32)
CREAT SERPL-MCNC: 1.25 MG/DL (ref 0.5–1.05)
EGFRCR SERPLBLD CKD-EPI 2021: 46 ML/MIN/1.73M*2
EOSINOPHIL # BLD AUTO: 0.13 X10*3/UL (ref 0–0.4)
EOSINOPHIL NFR BLD AUTO: 2.3 %
ERYTHROCYTE [DISTWIDTH] IN BLOOD BY AUTOMATED COUNT: 15 % (ref 11.5–14.5)
GLUCOSE SERPL-MCNC: 107 MG/DL (ref 74–99)
HCT VFR BLD AUTO: 41.4 % (ref 36–46)
HGB BLD-MCNC: 13 G/DL (ref 12–16)
IGA SERPL-MCNC: 348 MG/DL (ref 70–400)
IGG SERPL-MCNC: 1790 MG/DL (ref 700–1600)
IGM SERPL-MCNC: 131 MG/DL (ref 40–230)
IMM GRANULOCYTES # BLD AUTO: 0.05 X10*3/UL (ref 0–0.5)
IMM GRANULOCYTES NFR BLD AUTO: 0.9 % (ref 0–0.9)
KAPPA LC SERPL-MCNC: 5.86 MG/DL (ref 0.33–1.94)
KAPPA LC/LAMBDA SER: 1.51 {RATIO} (ref 0.26–1.65)
LAMBDA LC SERPL-MCNC: 3.87 MG/DL (ref 0.57–2.63)
LYMPHOCYTES # BLD AUTO: 1.26 X10*3/UL (ref 0.8–3)
LYMPHOCYTES NFR BLD AUTO: 22.5 %
MCH RBC QN AUTO: 26.5 PG (ref 26–34)
MCHC RBC AUTO-ENTMCNC: 31.4 G/DL (ref 32–36)
MCV RBC AUTO: 84 FL (ref 80–100)
MONOCYTES # BLD AUTO: 0.32 X10*3/UL (ref 0.05–0.8)
MONOCYTES NFR BLD AUTO: 5.7 %
NEUTROPHILS # BLD AUTO: 3.79 X10*3/UL (ref 1.6–5.5)
NEUTROPHILS NFR BLD AUTO: 67.5 %
NRBC BLD-RTO: 0 /100 WBCS (ref 0–0)
PLATELET # BLD AUTO: 200 X10*3/UL (ref 150–450)
POTASSIUM SERPL-SCNC: 5.1 MMOL/L (ref 3.5–5.3)
PROT SERPL-MCNC: 8.5 G/DL (ref 6.4–8.2)
PROT SERPL-MCNC: 8.6 G/DL (ref 6.4–8.2)
RBC # BLD AUTO: 4.91 X10*6/UL (ref 4–5.2)
SODIUM SERPL-SCNC: 138 MMOL/L (ref 136–145)
WBC # BLD AUTO: 5.6 X10*3/UL (ref 4.4–11.3)

## 2025-06-06 PROCEDURE — 82232 ASSAY OF BETA-2 PROTEIN: CPT

## 2025-06-06 PROCEDURE — 1126F AMNT PAIN NOTED NONE PRSNT: CPT

## 2025-06-06 PROCEDURE — 83521 IG LIGHT CHAINS FREE EACH: CPT

## 2025-06-06 PROCEDURE — 84165 PROTEIN E-PHORESIS SERUM: CPT | Performed by: STUDENT IN AN ORGANIZED HEALTH CARE EDUCATION/TRAINING PROGRAM

## 2025-06-06 PROCEDURE — 80053 COMPREHEN METABOLIC PANEL: CPT

## 2025-06-06 PROCEDURE — 99205 OFFICE O/P NEW HI 60 MIN: CPT

## 2025-06-06 PROCEDURE — 86320 SERUM IMMUNOELECTROPHORESIS: CPT | Performed by: STUDENT IN AN ORGANIZED HEALTH CARE EDUCATION/TRAINING PROGRAM

## 2025-06-06 PROCEDURE — 82784 ASSAY IGA/IGD/IGG/IGM EACH: CPT

## 2025-06-06 PROCEDURE — 86334 IMMUNOFIX E-PHORESIS SERUM: CPT

## 2025-06-06 PROCEDURE — 1159F MED LIST DOCD IN RCRD: CPT

## 2025-06-06 PROCEDURE — 3077F SYST BP >= 140 MM HG: CPT

## 2025-06-06 PROCEDURE — 1036F TOBACCO NON-USER: CPT

## 2025-06-06 PROCEDURE — 99215 OFFICE O/P EST HI 40 MIN: CPT | Mod: 25

## 2025-06-06 PROCEDURE — 85025 COMPLETE CBC W/AUTO DIFF WBC: CPT

## 2025-06-06 PROCEDURE — 84155 ASSAY OF PROTEIN SERUM: CPT

## 2025-06-06 PROCEDURE — 36415 COLL VENOUS BLD VENIPUNCTURE: CPT

## 2025-06-06 PROCEDURE — 3079F DIAST BP 80-89 MM HG: CPT

## 2025-06-06 ASSESSMENT — PAIN SCALES - GENERAL: PAINLEVEL_OUTOF10: 0-NO PAIN

## 2025-06-06 NOTE — PROGRESS NOTES
"Patient ID: May Pratt is a 74 y.o. female.    SUBJECTIVE   Patient presents to establish care for MGUS, she was previously followed by Dr. Yair Rodgers at [facility]. PMH includes CHF, Primary hyperparathyroidism, HTN, OA, lymphedema. She reports feeling well overall, denies n/v/d/c, fevers, night sweats, unintentional weight loss, new bony pain, SOB, edema, neuropathy.       Review of Systems   All other systems reviewed and are negative.      OBJECTIVE      BSA: There is no height or weight on file to calculate BSA.  /82   Pulse 53   Temp 36.1 °C (97 °F)   SpO2 98%     Physical Exam  Constitutional:       Appearance: Normal appearance.   Eyes:      Conjunctiva/sclera: Conjunctivae normal.      Pupils: Pupils are equal, round, and reactive to light.   Cardiovascular:      Rate and Rhythm: Normal rate and regular rhythm.      Pulses: Normal pulses.      Heart sounds: Normal heart sounds.   Pulmonary:      Effort: Pulmonary effort is normal.      Breath sounds: Normal breath sounds.   Abdominal:      General: Abdomen is flat. Bowel sounds are normal.   Musculoskeletal:         General: Normal range of motion.   Skin:     General: Skin is warm.   Neurological:      General: No focal deficit present.      Mental Status: She is alert. Mental status is at baseline.   Psychiatric:         Mood and Affect: Mood normal.         Thought Content: Thought content normal.         Performance Status:  Karnofsky Score: 100 - Fully active, able to carry on all pre-disease performed without restriction    MONITORING: MYELOMA LAB MARKERS  MARKERS RECENT LAB VALUES   Free Kappa Light Chains No results found for: \"KAPPA\"   Free Lambda Light Chains No results found for: \"LAMBDA\"   Free Light Chain Ratio No results found for: \"KAPLS\"    Immunofixation No results found for: \"IEPIN\"   IgG No results found for: \"IGG\"   IgA No results found for: \"IGA\"   IgM No results found for: \"IGM\"     Other Labs  Lab Results " "  Component Value Date    WBC 5.6 06/06/2025    NRBC 0.0 06/06/2025    RBC 4.91 06/06/2025    HGB 13.0 06/06/2025    HCT 41.4 06/06/2025    MCV 84 06/06/2025    MCH 26.5 06/06/2025    MCHC 31.4 (L) 06/06/2025    RDW 15.0 (H) 06/06/2025     06/06/2025    IGPCT 0.9 06/06/2025    LYMPHOPCT 22.5 06/06/2025    MONOPCT 5.7 06/06/2025    EOSPCT 2.3 06/06/2025    BASOPCT 1.1 06/06/2025    NEUTROABS 3.79 06/06/2025    MONOSABS 0.32 06/06/2025    EOSABS 0.13 06/06/2025       Lab Results   Component Value Date    GLUCOSE 107 (H) 06/06/2025     06/06/2025    K 5.1 06/06/2025     06/06/2025    CO2 28 06/06/2025    ANIONGAP 13 06/06/2025    BUN 36 (H) 06/06/2025    CREATININE 1.25 (H) 06/06/2025    EGFR 46 (L) 06/06/2025    CALCIUM 10.6 (H) 06/06/2025    ALBUMIN 4.3 06/06/2025    ALKPHOS 107 06/06/2025    PROT 8.6 (H) 06/06/2025    AST 14 06/06/2025    BILITOT 0.7 06/06/2025    ALT 11 06/06/2025       No results found for: \"LDH\"    ASSESSMENT & PLAN   IgG Kappa and IgG Lambda MGUS  - labs today: CBC, CMP, SPEP, UPEP, FLC, B2 microglobin, immunoglobulins  - last SPEP done 2023 showed paraprotein 0.8g/dl   - no SliM CRAB criteria  - asympomatic  - RTC 2 weeks to review labs     BROOKE Desir-CNP  "

## 2025-06-10 LAB
ALBUMIN: 4.3 G/DL (ref 3.4–5)
ALPHA 1 GLOBULIN: 0.3 G/DL (ref 0.2–0.6)
ALPHA 2 GLOBULIN: 0.9 G/DL (ref 0.4–1.1)
BETA GLOBULIN: 1 G/DL (ref 0.5–1.2)
GAMMA GLOBULIN: 1.9 G/DL (ref 0.5–1.4)
IMMUNOFIXATION COMMENT: ABNORMAL
M-PROTEIN 1: 0.4 G/DL (ref ?–0)
M-PROTEIN 2: 0.2 G/DL (ref ?–0)
PATH REVIEW - SERUM IMMUNOFIXATION: ABNORMAL
PATH REVIEW-SERUM PROTEIN ELECTROPHORESIS: ABNORMAL
PROTEIN ELECTROPHORESIS COMMENT: ABNORMAL

## 2025-06-11 DIAGNOSIS — I10 ESSENTIAL (PRIMARY) HYPERTENSION: ICD-10-CM

## 2025-06-11 DIAGNOSIS — Z76.0 MEDICATION REFILL: ICD-10-CM

## 2025-06-12 RX ORDER — NADOLOL 80 MG/1
80 TABLET ORAL DAILY
Qty: 90 TABLET | Refills: 3 | Status: SHIPPED | OUTPATIENT
Start: 2025-06-12

## 2025-06-13 ENCOUNTER — TELEMEDICINE (OUTPATIENT)
Dept: HEMATOLOGY/ONCOLOGY | Facility: HOSPITAL | Age: 75
End: 2025-06-13
Payer: MEDICARE

## 2025-06-13 DIAGNOSIS — D47.2 MGUS (MONOCLONAL GAMMOPATHY OF UNKNOWN SIGNIFICANCE): Primary | ICD-10-CM

## 2025-06-13 PROCEDURE — 99213 OFFICE O/P EST LOW 20 MIN: CPT

## 2025-06-13 NOTE — PROGRESS NOTES
Patient ID: May Pratt is a 74 y.o. female.    SUBJECTIVE   Patient presents to establish care for MGUS, she was previously followed by Dr. Yair Rodgers at [facility]. PMH includes CHF, Primary hyperparathyroidism, HTN, OA, lymphedema. She reports feeling well overall, denies n/v/d/c, fevers, night sweats, unintentional weight loss, new bony pain, SOB, edema, neuropathy.     6/13/25: presents virtually to review labs and plan      Review of Systems   All other systems reviewed and are negative.      OBJECTIVE      BSA: There is no height or weight on file to calculate BSA.  There were no vitals taken for this visit.    Physical Exam    Performance Status:  Karnofsky Score: 100 - Fully active, able to carry on all pre-disease performed without restriction    MONITORING: MYELOMA LAB MARKERS  MARKERS RECENT LAB VALUES   Free Kappa Light Chains Lab Results   Component Value Date    KAPPA 5.86 (H) 06/06/2025      Free Lambda Light Chains Lab Results   Component Value Date    LAMBDA 3.87 (H) 06/06/2025      Free Light Chain Ratio Lab Results   Component Value Date    KAPLS 1.51 06/06/2025       Immunofixation Lab Results   Component Value Date    IEPIN  06/06/2025 6/6/25  Known monoclonal IgG kappa in the gamma region at 0.4 g/dL and monoclonal IgG lambda in the gamma region at 0.2 g/dL.  Last detected on 12/16/22 at 0.8 g/dL and 0.2 g/dL, respectively.      IgG Lab Results   Component Value Date    IGG 1,790 (H) 06/06/2025      IgA Lab Results   Component Value Date     06/06/2025      IgM Lab Results   Component Value Date     06/06/2025        Other Labs  Lab Results   Component Value Date    WBC 5.6 06/06/2025    NRBC 0.0 06/06/2025    RBC 4.91 06/06/2025    HGB 13.0 06/06/2025    HCT 41.4 06/06/2025    MCV 84 06/06/2025    MCH 26.5 06/06/2025    MCHC 31.4 (L) 06/06/2025    RDW 15.0 (H) 06/06/2025     06/06/2025    IGPCT 0.9 06/06/2025    LYMPHOPCT 22.5 06/06/2025    MONOPCT 5.7  "06/06/2025    EOSPCT 2.3 06/06/2025    BASOPCT 1.1 06/06/2025    NEUTROABS 3.79 06/06/2025    MONOSABS 0.32 06/06/2025    EOSABS 0.13 06/06/2025       Lab Results   Component Value Date    GLUCOSE 107 (H) 06/06/2025     06/06/2025    K 5.1 06/06/2025     06/06/2025    CO2 28 06/06/2025    ANIONGAP 13 06/06/2025    BUN 36 (H) 06/06/2025    CREATININE 1.25 (H) 06/06/2025    EGFR 46 (L) 06/06/2025    CALCIUM 10.6 (H) 06/06/2025    ALBUMIN 4.3 06/06/2025    ALKPHOS 107 06/06/2025    PROT 8.6 (H) 06/06/2025    PROT 8.5 (H) 06/06/2025    AST 14 06/06/2025    BILITOT 0.7 06/06/2025    ALT 11 06/06/2025       No results found for: \"LDH\"    ASSESSMENT & PLAN   IgG Kappa and IgG Lambda MGUS  - labs today: CBC, CMP, SPEP, UPEP, FLC, B2 microglobin, immunoglobulins  - last SPEP done 2023 showed paraprotein 0.8g/dl   - no SliM CRAB criteria  - asympomatic  - RTC 6 mo w/ repeat labs     BROOKE Desir-CNP  "